# Patient Record
Sex: MALE | Race: WHITE | Employment: FULL TIME | URBAN - NONMETROPOLITAN AREA
[De-identification: names, ages, dates, MRNs, and addresses within clinical notes are randomized per-mention and may not be internally consistent; named-entity substitution may affect disease eponyms.]

---

## 2017-01-05 ENCOUNTER — APPOINTMENT (OUTPATIENT)
Dept: LAB | Age: 47
End: 2017-01-05
Attending: FAMILY MEDICINE
Payer: COMMERCIAL

## 2017-01-05 ENCOUNTER — OFFICE VISIT (OUTPATIENT)
Dept: FAMILY MEDICINE CLINIC | Facility: CLINIC | Age: 47
End: 2017-01-05

## 2017-01-05 VITALS
TEMPERATURE: 98 F | HEART RATE: 76 BPM | BODY MASS INDEX: 34 KG/M2 | OXYGEN SATURATION: 98 % | WEIGHT: 228 LBS | DIASTOLIC BLOOD PRESSURE: 80 MMHG | SYSTOLIC BLOOD PRESSURE: 122 MMHG

## 2017-01-05 DIAGNOSIS — N52.1 ERECTILE DYSFUNCTION DUE TO DISEASES CLASSIFIED ELSEWHERE: ICD-10-CM

## 2017-01-05 DIAGNOSIS — G54.0 THORACIC OUTLET SYNDROME: Primary | ICD-10-CM

## 2017-01-05 DIAGNOSIS — Z13.220 SCREENING FOR HYPERCHOLESTEROLEMIA: ICD-10-CM

## 2017-01-05 DIAGNOSIS — I10 ESSENTIAL HYPERTENSION: ICD-10-CM

## 2017-01-05 DIAGNOSIS — Z00.00 LABORATORY EXAM ORDERED AS PART OF ROUTINE GENERAL MEDICAL EXAMINATION: ICD-10-CM

## 2017-01-05 LAB
ALBUMIN SERPL-MCNC: 4.2 G/DL (ref 3.5–4.8)
ALP LIVER SERPL-CCNC: 88 U/L (ref 45–117)
ALT SERPL-CCNC: 96 U/L (ref 17–63)
AST SERPL-CCNC: 41 U/L (ref 15–41)
BILIRUB SERPL-MCNC: 0.4 MG/DL (ref 0.1–2)
BUN BLD-MCNC: 12 MG/DL (ref 8–20)
CALCIUM BLD-MCNC: 8.9 MG/DL (ref 8.3–10.3)
CHLORIDE: 104 MMOL/L (ref 101–111)
CHOLEST SMN-MCNC: 173 MG/DL (ref ?–200)
CO2: 27 MMOL/L (ref 22–32)
CREAT BLD-MCNC: 0.86 MG/DL (ref 0.7–1.3)
GLUCOSE BLD-MCNC: 122 MG/DL (ref 70–99)
HDLC SERPL-MCNC: 32 MG/DL (ref 45–?)
HDLC SERPL: 5.41 {RATIO} (ref ?–4.97)
LDLC SERPL CALC-MCNC: 110 MG/DL (ref ?–130)
M PROTEIN MFR SERPL ELPH: 7.7 G/DL (ref 6.1–8.3)
NONHDLC SERPL-MCNC: 141 MG/DL (ref ?–130)
POTASSIUM SERPL-SCNC: 4.3 MMOL/L (ref 3.6–5.1)
SODIUM SERPL-SCNC: 137 MMOL/L (ref 136–144)
TRIGLYCERIDES: 156 MG/DL (ref ?–150)
VLDL: 31 MG/DL (ref 5–40)

## 2017-01-05 PROCEDURE — 80053 COMPREHEN METABOLIC PANEL: CPT | Performed by: FAMILY MEDICINE

## 2017-01-05 PROCEDURE — 36415 COLL VENOUS BLD VENIPUNCTURE: CPT | Performed by: FAMILY MEDICINE

## 2017-01-05 PROCEDURE — 99214 OFFICE O/P EST MOD 30 MIN: CPT | Performed by: FAMILY MEDICINE

## 2017-01-05 PROCEDURE — 80061 LIPID PANEL: CPT | Performed by: FAMILY MEDICINE

## 2017-01-05 RX ORDER — LISINOPRIL 20 MG/1
TABLET ORAL
Qty: 90 TABLET | Refills: 1 | Status: SHIPPED | OUTPATIENT
Start: 2017-01-05 | End: 2017-09-25

## 2017-01-05 RX ORDER — SILDENAFIL 50 MG/1
50 TABLET, FILM COATED ORAL
Qty: 6 TABLET | Refills: 0 | Status: SHIPPED
Start: 2017-01-05

## 2017-01-05 NOTE — PROGRESS NOTES
HPI:    Patient ID: Rachel Barnett is a 51year old male. Patient presents with:  Numbness: TINGLING IN LEFT ARM---- THINKS IT COULD BE FROM LISINOPRIL-    HPI \"sometimes\" has pains and tingling into left finger and arm, no problems now, comes and goes x

## 2017-01-06 DIAGNOSIS — R74.8 ELEVATED LIVER ENZYMES: ICD-10-CM

## 2017-01-06 DIAGNOSIS — R73.09 ELEVATED GLUCOSE LEVEL: Primary | ICD-10-CM

## 2017-01-06 DIAGNOSIS — E78.00 ELEVATED CHOLESTEROL: ICD-10-CM

## 2017-01-10 ENCOUNTER — APPOINTMENT (OUTPATIENT)
Dept: LAB | Age: 47
End: 2017-01-10
Attending: FAMILY MEDICINE
Payer: COMMERCIAL

## 2017-01-10 DIAGNOSIS — R74.8 ELEVATED LIVER ENZYMES: ICD-10-CM

## 2017-01-10 DIAGNOSIS — R73.09 ELEVATED GLUCOSE LEVEL: ICD-10-CM

## 2017-01-10 LAB
EST. AVERAGE GLUCOSE BLD GHB EST-MCNC: 123 MG/DL (ref 68–126)
GLUCOSE BLD-MCNC: 102 MG/DL (ref 70–99)
HBA1C MFR BLD HPLC: 5.9 % (ref ?–5.7)
HBV SURFACE AB SER QL: NONREACTIVE
HBV SURFACE AB SERPL IA-ACNC: 7.46 MIU/ML
HBV SURFACE AG SERPL QL IA: NONREACTIVE
HEPATITIS C VIRUS AB INTERPRETATION: NONREACTIVE

## 2017-01-10 PROCEDURE — 82947 ASSAY GLUCOSE BLOOD QUANT: CPT

## 2017-01-10 PROCEDURE — 87340 HEPATITIS B SURFACE AG IA: CPT

## 2017-01-10 PROCEDURE — 36415 COLL VENOUS BLD VENIPUNCTURE: CPT

## 2017-01-10 PROCEDURE — 83036 HEMOGLOBIN GLYCOSYLATED A1C: CPT

## 2017-01-10 PROCEDURE — 86706 HEP B SURFACE ANTIBODY: CPT

## 2017-01-10 PROCEDURE — 86803 HEPATITIS C AB TEST: CPT

## 2017-01-11 DIAGNOSIS — R79.89 ELEVATED LFTS: ICD-10-CM

## 2017-01-11 DIAGNOSIS — R73.9 ELEVATED BLOOD SUGAR: Primary | ICD-10-CM

## 2017-02-02 ENCOUNTER — TELEPHONE (OUTPATIENT)
Dept: FAMILY MEDICINE CLINIC | Facility: CLINIC | Age: 47
End: 2017-02-02

## 2017-02-13 ENCOUNTER — MED REC SCAN ONLY (OUTPATIENT)
Dept: FAMILY MEDICINE CLINIC | Facility: CLINIC | Age: 47
End: 2017-02-13

## 2017-08-21 ENCOUNTER — TELEPHONE (OUTPATIENT)
Dept: FAMILY MEDICINE CLINIC | Facility: CLINIC | Age: 47
End: 2017-08-21

## 2017-09-25 RX ORDER — LISINOPRIL 20 MG/1
TABLET ORAL
Qty: 30 TABLET | Refills: 0 | Status: SHIPPED | OUTPATIENT
Start: 2017-09-25 | End: 2017-10-26

## 2017-10-26 NOTE — TELEPHONE ENCOUNTER
Last Ov 1/5/17, last labs 1/10/17, last refill 9/25/17. Message left for patient in August that he is due for labs.

## 2017-10-27 RX ORDER — LISINOPRIL 20 MG/1
TABLET ORAL
Qty: 30 TABLET | Refills: 0 | Status: SHIPPED | OUTPATIENT
Start: 2017-10-27 | End: 2017-11-07

## 2017-11-06 ENCOUNTER — APPOINTMENT (OUTPATIENT)
Dept: LAB | Age: 47
End: 2017-11-06
Attending: FAMILY MEDICINE
Payer: COMMERCIAL

## 2017-11-06 DIAGNOSIS — R73.9 ELEVATED BLOOD SUGAR: ICD-10-CM

## 2017-11-06 DIAGNOSIS — R79.89 ELEVATED LFTS: ICD-10-CM

## 2017-11-06 PROCEDURE — 83036 HEMOGLOBIN GLYCOSYLATED A1C: CPT | Performed by: FAMILY MEDICINE

## 2017-11-06 PROCEDURE — 80076 HEPATIC FUNCTION PANEL: CPT | Performed by: FAMILY MEDICINE

## 2017-11-06 PROCEDURE — 36415 COLL VENOUS BLD VENIPUNCTURE: CPT | Performed by: FAMILY MEDICINE

## 2017-11-07 ENCOUNTER — OFFICE VISIT (OUTPATIENT)
Dept: FAMILY MEDICINE CLINIC | Facility: CLINIC | Age: 47
End: 2017-11-07

## 2017-11-07 VITALS
HEART RATE: 72 BPM | BODY MASS INDEX: 32.07 KG/M2 | SYSTOLIC BLOOD PRESSURE: 122 MMHG | WEIGHT: 224 LBS | TEMPERATURE: 98 F | HEIGHT: 70 IN | DIASTOLIC BLOOD PRESSURE: 80 MMHG | OXYGEN SATURATION: 99 %

## 2017-11-07 DIAGNOSIS — R20.2 LEFT LEG PARESTHESIAS: ICD-10-CM

## 2017-11-07 DIAGNOSIS — R73.03 PRE-DIABETES: ICD-10-CM

## 2017-11-07 DIAGNOSIS — R11.0 POSTPRANDIAL NAUSEA: Primary | ICD-10-CM

## 2017-11-07 DIAGNOSIS — I10 ESSENTIAL HYPERTENSION: ICD-10-CM

## 2017-11-07 PROCEDURE — 99214 OFFICE O/P EST MOD 30 MIN: CPT | Performed by: FAMILY MEDICINE

## 2017-11-07 RX ORDER — LISINOPRIL 20 MG/1
20 TABLET ORAL
Qty: 30 TABLET | Refills: 6 | Status: SHIPPED | OUTPATIENT
Start: 2017-11-07 | End: 2018-06-25

## 2017-11-07 NOTE — PROGRESS NOTES
HPI:    Patient ID: Nury Arita is a 49year old male.   Patient presents with:  Lightheadedness: DIZZY ABOUT 30 MIN AFTER HE EATS--    HPI pt states he gets nauseated and \"dizzy\" 30 mins after eating, regardless of food, better 20 mins later, no verti normal.   Abdominal: Soft. Bowel sounds are normal. There is no tenderness.    Musculoskeletal:        Right hip: Normal.        Left hip: Normal.        Lumbar back: Normal.   Bilateral posterior hip tightness  Negative straight leg raising   Neurological: feel dietary changes will make a big difference in how he feels. I discussed the \"whole 30 diet\". I would like him to avoid grains, dairy and alcohol for 30 days. I would like him to avoid legumes but may have tree nuts.   He may use Dilia milk but no

## 2017-11-07 NOTE — PATIENT INSTRUCTIONS
I reviewed exam with the patient. I reviewed most recent labs including the presence of prediabetes. LFTs are still elevated but improving  I advised patient that I feel dietary changes will make a big difference in how he feels.   I discussed the [facility]

## 2017-11-28 ENCOUNTER — HOSPITAL ENCOUNTER (OUTPATIENT)
Age: 47
Discharge: HOME | End: 2017-11-28
Attending: FAMILY MEDICINE
Payer: OTHER MISCELLANEOUS

## 2017-11-28 ENCOUNTER — APPOINTMENT (OUTPATIENT)
Dept: CT IMAGING | Age: 47
End: 2017-11-28
Attending: FAMILY MEDICINE
Payer: OTHER MISCELLANEOUS

## 2017-11-28 VITALS
HEART RATE: 88 BPM | TEMPERATURE: 98 F | DIASTOLIC BLOOD PRESSURE: 88 MMHG | SYSTOLIC BLOOD PRESSURE: 140 MMHG | OXYGEN SATURATION: 98 % | RESPIRATION RATE: 16 BRPM

## 2017-11-28 DIAGNOSIS — R10.32 ABDOMINAL PAIN, LEFT LOWER QUADRANT: Primary | ICD-10-CM

## 2017-11-28 PROCEDURE — 74176 CT ABD & PELVIS W/O CONTRAST: CPT | Performed by: FAMILY MEDICINE

## 2017-11-28 PROCEDURE — 99215 OFFICE O/P EST HI 40 MIN: CPT

## 2017-11-28 PROCEDURE — 99214 OFFICE O/P EST MOD 30 MIN: CPT

## 2017-11-28 NOTE — ED INITIAL ASSESSMENT (HPI)
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 Pt states pain worsens with any movement. No vomiting or bulging in the area.

## 2017-11-30 NOTE — ED PROVIDER NOTES
Patient Seen in: [address] [facility]    History   Patient presents with:  Abdomen/Flank Pain (GI/)    Stated Complaint: Ab pain left side    HPI    49year old male presents for left sided abdominal pain.  Patients states last night , he was atraumatic. Right Ear: External ear normal.   Left Ear: External ear normal.   Nose: Nose normal.   Mouth/Throat: Oropharynx is clear and moist.   Eyes: Conjunctivae and EOM are normal. Pupils are equal, round, and reactive to light.    Neck: Normal range

## 2017-12-01 ENCOUNTER — OFFICE VISIT (OUTPATIENT)
Dept: FAMILY MEDICINE CLINIC | Facility: CLINIC | Age: 47
End: 2017-12-01

## 2017-12-01 VITALS
DIASTOLIC BLOOD PRESSURE: 80 MMHG | SYSTOLIC BLOOD PRESSURE: 120 MMHG | BODY MASS INDEX: 32.35 KG/M2 | OXYGEN SATURATION: 99 % | HEIGHT: 70 IN | WEIGHT: 226 LBS | TEMPERATURE: 98 F | HEART RATE: 72 BPM

## 2017-12-01 DIAGNOSIS — R91.1 INCIDENTAL LUNG NODULE: ICD-10-CM

## 2017-12-01 DIAGNOSIS — S39.011D STRAIN OF ABDOMINAL MUSCLE, SUBSEQUENT ENCOUNTER: Primary | ICD-10-CM

## 2017-12-01 PROCEDURE — 99214 OFFICE O/P EST MOD 30 MIN: CPT | Performed by: FAMILY MEDICINE

## 2017-12-01 NOTE — PROGRESS NOTES
HPI:    Patient ID: Chel Simental is a 49year old male.   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 Neurological: He is alert and oriented to person, place, and time. Skin: Skin is warm and dry. Psychiatric: His mood appears anxious.    Blood pressure 120/80, pulse 72, temperature 97.7 °F (36.5 °C), tempera

## 2017-12-01 NOTE — PATIENT INSTRUCTIONS
I discussed the diagnosis of abdominal wall strain as well as avoidance of contributing factors. Patient may use moist heat and NSAIDs such as Aleve or ibuprofen as needed for pain or discomfort.   Note was given for him to return to work 12/4/17 without r

## 2018-06-25 RX ORDER — LISINOPRIL 20 MG/1
TABLET ORAL
Qty: 30 TABLET | Refills: 0 | Status: SHIPPED | OUTPATIENT
Start: 2018-06-25 | End: 2018-09-25

## 2018-07-23 RX ORDER — LISINOPRIL 20 MG/1
TABLET ORAL
Qty: 30 TABLET | Refills: 0 | OUTPATIENT
Start: 2018-07-23

## 2018-07-23 NOTE — TELEPHONE ENCOUNTER
CONTACTED PATIENT TO SCHEDULE OV AND LABS, STATES HE HAS TO LOOK AT HIS SCHEDULE. ALSO STATES THAT HE HAS \"PLENTY\" OF MEDICATION; SO REFILL REQUEST DENIED, ADVISED PATIENT TO SCHEDULE APPT PRIOR TO RUNNING OUT.      PATIENT EXPRESSED UNDERSTANDING AND S

## 2018-09-25 ENCOUNTER — TELEPHONE (OUTPATIENT)
Dept: FAMILY MEDICINE CLINIC | Facility: CLINIC | Age: 48
End: 2018-09-25

## 2018-09-25 RX ORDER — LISINOPRIL 20 MG/1
TABLET ORAL
Qty: 30 TABLET | Refills: 0 | Status: SHIPPED | OUTPATIENT
Start: 2018-09-25 | End: 2018-10-02

## 2018-09-25 NOTE — TELEPHONE ENCOUNTER
LISINOPRIL 20 MG Oral Tab   PT HAS 4 PILLS LEFT    PT IS COMING IN FOR A BP CHECK W/DR NIELSEN ON 10/2 @8:30

## 2018-10-02 ENCOUNTER — OFFICE VISIT (OUTPATIENT)
Dept: FAMILY MEDICINE CLINIC | Facility: CLINIC | Age: 48
End: 2018-10-02
Payer: COMMERCIAL

## 2018-10-02 VITALS
OXYGEN SATURATION: 98 % | DIASTOLIC BLOOD PRESSURE: 86 MMHG | HEIGHT: 69 IN | HEART RATE: 76 BPM | SYSTOLIC BLOOD PRESSURE: 128 MMHG | BODY MASS INDEX: 34.66 KG/M2 | WEIGHT: 234 LBS | TEMPERATURE: 98 F

## 2018-10-02 DIAGNOSIS — Z23 NEED FOR VACCINATION: ICD-10-CM

## 2018-10-02 DIAGNOSIS — E66.9 OBESITY (BMI 30.0-34.9): ICD-10-CM

## 2018-10-02 DIAGNOSIS — Z13.220 SCREENING FOR HYPERCHOLESTEROLEMIA: ICD-10-CM

## 2018-10-02 DIAGNOSIS — K76.0 FATTY LIVER: ICD-10-CM

## 2018-10-02 DIAGNOSIS — R73.03 PRE-DIABETES: ICD-10-CM

## 2018-10-02 DIAGNOSIS — I10 ESSENTIAL HYPERTENSION: Primary | ICD-10-CM

## 2018-10-02 PROCEDURE — 99214 OFFICE O/P EST MOD 30 MIN: CPT | Performed by: FAMILY MEDICINE

## 2018-10-02 PROCEDURE — 90686 IIV4 VACC NO PRSV 0.5 ML IM: CPT | Performed by: FAMILY MEDICINE

## 2018-10-02 PROCEDURE — 90471 IMMUNIZATION ADMIN: CPT | Performed by: FAMILY MEDICINE

## 2018-10-02 RX ORDER — LISINOPRIL 20 MG/1
TABLET ORAL
Qty: 30 TABLET | Refills: 6 | Status: SHIPPED | OUTPATIENT
Start: 2018-10-02 | End: 2019-05-07

## 2018-10-02 NOTE — PROGRESS NOTES
HPI:    Patient ID: Virgil Amador is a 51year old male.     Patient presents with:  HTN: f/u  Pre-diabetes: f/u    occ checks bp, 130-134/80-85  Not exercising, 3 cokes , high carb        Review of Systems   Constitutional: Negative for activity change, a body weight. I discussed importance of annual dilated retinal exams and glaucoma screening, routine foot exams and good fitting footwear.   Chris alvarez fasting lipids, cmp, hgba1c  Discussed importance of lower carbohydrate food choices, eating behavior may

## 2019-05-07 RX ORDER — LISINOPRIL 20 MG/1
TABLET ORAL
Qty: 30 TABLET | Refills: 0 | Status: SHIPPED | OUTPATIENT
Start: 2019-05-07 | End: 2019-07-24

## 2019-07-24 RX ORDER — LISINOPRIL 20 MG/1
TABLET ORAL
Qty: 90 TABLET | Refills: 0 | Status: SHIPPED | OUTPATIENT
Start: 2019-07-24 | End: 2019-10-17

## 2019-07-31 ENCOUNTER — PATIENT OUTREACH (OUTPATIENT)
Dept: FAMILY MEDICINE CLINIC | Facility: CLINIC | Age: 49
End: 2019-07-31

## 2019-10-07 ENCOUNTER — PATIENT OUTREACH (OUTPATIENT)
Dept: FAMILY MEDICINE CLINIC | Facility: CLINIC | Age: 49
End: 2019-10-07

## 2019-10-17 RX ORDER — LISINOPRIL 20 MG/1
TABLET ORAL
Qty: 30 TABLET | Refills: 0 | Status: SHIPPED | OUTPATIENT
Start: 2019-10-17 | End: 2020-02-26

## 2019-10-17 RX ORDER — LISINOPRIL 20 MG/1
TABLET ORAL
Qty: 30 TABLET | Refills: 0 | Status: SHIPPED | OUTPATIENT
Start: 2019-10-17 | End: 2019-10-17

## 2019-10-17 NOTE — TELEPHONE ENCOUNTER
Last office visit: 10/2/18 /86  Last refill: 7/24/19  Labs Due: 10/2/18  No future appointments. Message left for patient to call back and schedule labs and office visit.

## 2020-02-26 ENCOUNTER — OFFICE VISIT (OUTPATIENT)
Dept: FAMILY MEDICINE CLINIC | Facility: CLINIC | Age: 50
End: 2020-02-26
Payer: COMMERCIAL

## 2020-02-26 ENCOUNTER — APPOINTMENT (OUTPATIENT)
Dept: LAB | Age: 50
End: 2020-02-26
Attending: FAMILY MEDICINE
Payer: COMMERCIAL

## 2020-02-26 VITALS
TEMPERATURE: 97 F | SYSTOLIC BLOOD PRESSURE: 116 MMHG | HEIGHT: 69 IN | BODY MASS INDEX: 33.03 KG/M2 | HEART RATE: 82 BPM | WEIGHT: 223 LBS | DIASTOLIC BLOOD PRESSURE: 80 MMHG | OXYGEN SATURATION: 98 %

## 2020-02-26 DIAGNOSIS — R73.03 PRE-DIABETES: ICD-10-CM

## 2020-02-26 DIAGNOSIS — Z51.81 ENCOUNTER FOR MEDICATION MONITORING: ICD-10-CM

## 2020-02-26 DIAGNOSIS — H57.89 IRRITATION OF LEFT EYE: ICD-10-CM

## 2020-02-26 DIAGNOSIS — I10 ESSENTIAL HYPERTENSION: ICD-10-CM

## 2020-02-26 DIAGNOSIS — Z12.5 SCREENING FOR PROSTATE CANCER: ICD-10-CM

## 2020-02-26 DIAGNOSIS — I10 ESSENTIAL HYPERTENSION: Primary | ICD-10-CM

## 2020-02-26 DIAGNOSIS — Z12.11 SCREEN FOR COLON CANCER: ICD-10-CM

## 2020-02-26 DIAGNOSIS — E66.9 OBESITY (BMI 30.0-34.9): ICD-10-CM

## 2020-02-26 DIAGNOSIS — K76.0 FATTY LIVER: ICD-10-CM

## 2020-02-26 DIAGNOSIS — Z13.220 SCREENING, LIPID: ICD-10-CM

## 2020-02-26 DIAGNOSIS — Z80.8 FH: MELANOMA: ICD-10-CM

## 2020-02-26 LAB
ALBUMIN SERPL-MCNC: 4 G/DL (ref 3.4–5)
ALBUMIN/GLOB SERPL: 0.9 {RATIO} (ref 1–2)
ALP LIVER SERPL-CCNC: 80 U/L (ref 45–117)
ALT SERPL-CCNC: 62 U/L (ref 16–61)
ANION GAP SERPL CALC-SCNC: 6 MMOL/L (ref 0–18)
AST SERPL-CCNC: 31 U/L (ref 15–37)
BILIRUB SERPL-MCNC: 0.6 MG/DL (ref 0.1–2)
BUN BLD-MCNC: 15 MG/DL (ref 7–18)
BUN/CREAT SERPL: 16.7 (ref 10–20)
CALCIUM BLD-MCNC: 9.8 MG/DL (ref 8.5–10.1)
CHLORIDE SERPL-SCNC: 105 MMOL/L (ref 98–112)
CHOLEST SMN-MCNC: 192 MG/DL (ref ?–200)
CO2 SERPL-SCNC: 26 MMOL/L (ref 21–32)
COMPLEXED PSA SERPL-MCNC: 0.7 NG/ML (ref ?–4)
CREAT BLD-MCNC: 0.9 MG/DL (ref 0.7–1.3)
EST. AVERAGE GLUCOSE BLD GHB EST-MCNC: 131 MG/DL (ref 68–126)
GLOBULIN PLAS-MCNC: 4.3 G/DL (ref 2.8–4.4)
GLUCOSE BLD-MCNC: 131 MG/DL (ref 70–99)
HBA1C MFR BLD HPLC: 6.2 % (ref ?–5.7)
HDLC SERPL-MCNC: 35 MG/DL (ref 40–59)
LDLC SERPL CALC-MCNC: 128 MG/DL (ref ?–100)
M PROTEIN MFR SERPL ELPH: 8.3 G/DL (ref 6.4–8.2)
NONHDLC SERPL-MCNC: 157 MG/DL (ref ?–130)
OSMOLALITY SERPL CALC.SUM OF ELEC: 287 MOSM/KG (ref 275–295)
PATIENT FASTING Y/N/NP: YES
PATIENT FASTING Y/N/NP: YES
POTASSIUM SERPL-SCNC: 4.2 MMOL/L (ref 3.5–5.1)
SODIUM SERPL-SCNC: 137 MMOL/L (ref 136–145)
TRIGL SERPL-MCNC: 144 MG/DL (ref 30–149)
VLDLC SERPL CALC-MCNC: 29 MG/DL (ref 0–30)

## 2020-02-26 PROCEDURE — 80053 COMPREHEN METABOLIC PANEL: CPT | Performed by: FAMILY MEDICINE

## 2020-02-26 PROCEDURE — 84153 ASSAY OF PSA TOTAL: CPT | Performed by: FAMILY MEDICINE

## 2020-02-26 PROCEDURE — 83036 HEMOGLOBIN GLYCOSYLATED A1C: CPT | Performed by: FAMILY MEDICINE

## 2020-02-26 PROCEDURE — 99214 OFFICE O/P EST MOD 30 MIN: CPT | Performed by: FAMILY MEDICINE

## 2020-02-26 PROCEDURE — 36415 COLL VENOUS BLD VENIPUNCTURE: CPT | Performed by: FAMILY MEDICINE

## 2020-02-26 PROCEDURE — 80061 LIPID PANEL: CPT | Performed by: FAMILY MEDICINE

## 2020-02-26 RX ORDER — LISINOPRIL 20 MG/1
TABLET ORAL
Qty: 90 TABLET | Refills: 1 | Status: SHIPPED | OUTPATIENT
Start: 2020-02-26 | End: 2020-10-12

## 2020-02-26 RX ORDER — TOBRAMYCIN 3 MG/ML
1 SOLUTION/ DROPS OPHTHALMIC 4 TIMES DAILY
Qty: 5 ML | Refills: 0 | Status: SHIPPED | OUTPATIENT
Start: 2020-02-26 | End: 2020-03-02

## 2020-02-26 NOTE — PATIENT INSTRUCTIONS
I reviewed goals for blood pressure as well as conservative management of hypertension including sodium restriction, daily aerobic activity, alcohol moderation, smoking cessation, and maintaining ideal body weight.   I reviewed age-appropriate preventive he

## 2020-02-26 NOTE — PROGRESS NOTES
HPI:    Patient ID: Nader Barbour is a 47year old male.     Patient presents with:  HTN: f/u  Pre-diabetes: f/u  Obesity: f/u    Pt occasionally checking bp, 128-130/85  Wt down 11# since last visit  Energy \"ok\"  not exercising, starting keto diet, cutt round, and reactive to light. Conjunctivae, EOM and lids are normal. Lids are everted and swept, no foreign bodies found. Right eye exhibits no chemosis, no discharge, no exudate and no hordeolum. No foreign body present in the right eye.  Left eye exhibits aerobic activity with a goal of weight reduction. This will improve management of hypertension and help prevent development of diabetes and progressive liver disease.   Patient congratulated on making dietary changes  Would recommend the use of warm compre

## 2020-02-27 DIAGNOSIS — E78.00 ELEVATED CHOLESTEROL: ICD-10-CM

## 2020-02-27 DIAGNOSIS — K76.0 FATTY LIVER: ICD-10-CM

## 2020-02-27 DIAGNOSIS — R73.03 PRE-DIABETES: ICD-10-CM

## 2020-02-27 DIAGNOSIS — I10 ESSENTIAL HYPERTENSION: Primary | ICD-10-CM

## 2020-10-12 RX ORDER — LISINOPRIL 20 MG/1
TABLET ORAL
Qty: 30 TABLET | Refills: 0 | Status: SHIPPED | OUTPATIENT
Start: 2020-10-12 | End: 2020-11-09

## 2020-10-12 NOTE — TELEPHONE ENCOUNTER
Last refill #90 x 1 on 2/26/2020  Last office visit on 2/26/2020  No future appointments. BP Readings from Last 3 Encounters:  02/26/20 : 116/80  10/02/18 : 128/86  12/01/17 : 120/80    Patient is due for labs.

## 2020-11-09 ENCOUNTER — TELEPHONE (OUTPATIENT)
Dept: FAMILY MEDICINE CLINIC | Facility: CLINIC | Age: 50
End: 2020-11-09

## 2020-11-09 RX ORDER — LISINOPRIL 20 MG/1
TABLET ORAL
Qty: 15 TABLET | Refills: 0 | Status: SHIPPED | OUTPATIENT
Start: 2020-11-09 | End: 2021-05-05 | Stop reason: DRUGHIGH

## 2020-11-09 NOTE — TELEPHONE ENCOUNTER
Patient called back and was advised that he is due for an OV and fasting labs. Patient states that he does not want to come into the office because of COVID. Advised that we are not seeing sick patients in the office and he still declined an OV.  I did offe

## 2020-11-09 NOTE — TELEPHONE ENCOUNTER
Last OV: 2/26/20  Last labs: 2/26/20    No future appointments. Due for OV and fasting labs---- Left message for pt to call back to make an appointment. Qty reduced again.

## 2021-05-05 ENCOUNTER — OFFICE VISIT (OUTPATIENT)
Dept: FAMILY MEDICINE CLINIC | Facility: CLINIC | Age: 51
End: 2021-05-05
Payer: COMMERCIAL

## 2021-05-05 ENCOUNTER — MED REC SCAN ONLY (OUTPATIENT)
Dept: FAMILY MEDICINE CLINIC | Facility: CLINIC | Age: 51
End: 2021-05-05

## 2021-05-05 VITALS
BODY MASS INDEX: 33.77 KG/M2 | SYSTOLIC BLOOD PRESSURE: 148 MMHG | OXYGEN SATURATION: 98 % | WEIGHT: 228 LBS | HEART RATE: 76 BPM | RESPIRATION RATE: 16 BRPM | DIASTOLIC BLOOD PRESSURE: 98 MMHG | HEIGHT: 69 IN | TEMPERATURE: 98 F

## 2021-05-05 DIAGNOSIS — K76.0 FATTY LIVER: ICD-10-CM

## 2021-05-05 DIAGNOSIS — Z12.11 SCREEN FOR COLON CANCER: ICD-10-CM

## 2021-05-05 DIAGNOSIS — E78.6 LOW HDL (UNDER 40): ICD-10-CM

## 2021-05-05 DIAGNOSIS — I10 ESSENTIAL HYPERTENSION: Primary | ICD-10-CM

## 2021-05-05 DIAGNOSIS — R73.03 PRE-DIABETES: ICD-10-CM

## 2021-05-05 PROCEDURE — 3077F SYST BP >= 140 MM HG: CPT | Performed by: FAMILY MEDICINE

## 2021-05-05 PROCEDURE — 80061 LIPID PANEL: CPT | Performed by: FAMILY MEDICINE

## 2021-05-05 PROCEDURE — 80053 COMPREHEN METABOLIC PANEL: CPT | Performed by: FAMILY MEDICINE

## 2021-05-05 PROCEDURE — 3008F BODY MASS INDEX DOCD: CPT | Performed by: FAMILY MEDICINE

## 2021-05-05 PROCEDURE — 83036 HEMOGLOBIN GLYCOSYLATED A1C: CPT | Performed by: FAMILY MEDICINE

## 2021-05-05 PROCEDURE — 99213 OFFICE O/P EST LOW 20 MIN: CPT | Performed by: FAMILY MEDICINE

## 2021-05-05 PROCEDURE — 3080F DIAST BP >= 90 MM HG: CPT | Performed by: FAMILY MEDICINE

## 2021-05-05 RX ORDER — LISINOPRIL 10 MG/1
10 TABLET ORAL DAILY
Qty: 90 TABLET | Refills: 1 | Status: SHIPPED | OUTPATIENT
Start: 2021-05-05 | End: 2021-10-21

## 2021-05-05 RX ORDER — LISINOPRIL 20 MG/1
TABLET ORAL
Qty: 15 TABLET | Refills: 0 | Status: CANCELLED | OUTPATIENT
Start: 2021-05-05

## 2021-05-05 NOTE — PROGRESS NOTES
Nury Arita is a 45year old male. Patient presents with:  HTN  Medication Follow-Up  Pre-diabetes  Obesity    HPI:   Wt up 5# since last visit  Pt has checking bp, going up since being out of meds, out of meds x 1 month  Energy good  Activity: walking d peripheral pulses intact  GI: good BS's,no masses, HSM or tenderness  EXTREMITIES: FROM of all joints    ASSESSMENT AND PLAN:     Essential hypertension  (primary encounter diagnosis)  Pre-diabetes  Fatty liver  Screen for colon cancer  Low hdl (under 40)

## 2021-05-05 NOTE — PATIENT INSTRUCTIONS
I reviewed goals for blood pressure as well as conservative management of hypertension including sodium restriction, daily aerobic activity, alcohol moderation, smoking cessation, and maintaining ideal body weight.   Discussed importance of lower carbohydra

## 2021-10-20 DIAGNOSIS — I10 ESSENTIAL HYPERTENSION: ICD-10-CM

## 2021-10-21 RX ORDER — LISINOPRIL 10 MG/1
10 TABLET ORAL DAILY
Qty: 30 TABLET | Refills: 0 | Status: SHIPPED | OUTPATIENT
Start: 2021-10-21 | End: 2021-12-06

## 2021-10-21 NOTE — TELEPHONE ENCOUNTER
Requested Prescriptions     Pending Prescriptions Disp Refills   • lisinopril 10 MG Oral Tab 90 tablet 1     Sig: Take 1 tablet (10 mg total) by mouth daily.      Last refill #90 x 1 on 5/5/2021  Last office visit pertaining to refill on 5/5/2021  No future

## 2021-12-06 ENCOUNTER — TELEPHONE (OUTPATIENT)
Dept: FAMILY MEDICINE CLINIC | Facility: CLINIC | Age: 51
End: 2021-12-06

## 2021-12-06 DIAGNOSIS — Z79.899 ENCOUNTER FOR LONG-TERM (CURRENT) DRUG USE: ICD-10-CM

## 2021-12-06 DIAGNOSIS — E78.00 ELEVATED CHOLESTEROL: Primary | ICD-10-CM

## 2021-12-06 DIAGNOSIS — I10 ESSENTIAL HYPERTENSION: ICD-10-CM

## 2021-12-06 RX ORDER — LISINOPRIL 10 MG/1
10 TABLET ORAL DAILY
Qty: 30 TABLET | Refills: 0 | Status: SHIPPED | OUTPATIENT
Start: 2021-12-06

## 2021-12-06 NOTE — TELEPHONE ENCOUNTER
Last OV 5/5/21  No future appts scheduled    Due for OV and labs---CMP and lipids.     Pt advised---he will c/b to schedule an appt after 1/1/22

## 2022-04-05 ENCOUNTER — LABORATORY ENCOUNTER (OUTPATIENT)
Dept: LAB | Age: 52
End: 2022-04-05
Attending: FAMILY MEDICINE
Payer: COMMERCIAL

## 2022-04-05 ENCOUNTER — OFFICE VISIT (OUTPATIENT)
Dept: FAMILY MEDICINE CLINIC | Facility: CLINIC | Age: 52
End: 2022-04-05
Payer: COMMERCIAL

## 2022-04-05 VITALS
HEART RATE: 86 BPM | WEIGHT: 222 LBS | HEIGHT: 69 IN | DIASTOLIC BLOOD PRESSURE: 94 MMHG | BODY MASS INDEX: 32.88 KG/M2 | SYSTOLIC BLOOD PRESSURE: 150 MMHG | RESPIRATION RATE: 16 BRPM | OXYGEN SATURATION: 97 % | TEMPERATURE: 98 F

## 2022-04-05 DIAGNOSIS — M72.2 PLANTAR FASCIITIS, RIGHT: ICD-10-CM

## 2022-04-05 DIAGNOSIS — Z12.5 SCREENING FOR PROSTATE CANCER: ICD-10-CM

## 2022-04-05 DIAGNOSIS — R73.09 ELEVATED HEMOGLOBIN A1C: ICD-10-CM

## 2022-04-05 DIAGNOSIS — E78.00 ELEVATED CHOLESTEROL: ICD-10-CM

## 2022-04-05 DIAGNOSIS — E66.9 OBESITY (BMI 30.0-34.9): ICD-10-CM

## 2022-04-05 DIAGNOSIS — E11.9 TYPE 2 DIABETES MELLITUS WITHOUT COMPLICATION, WITHOUT LONG-TERM CURRENT USE OF INSULIN (HCC): ICD-10-CM

## 2022-04-05 DIAGNOSIS — Z12.11 SCREEN FOR COLON CANCER: ICD-10-CM

## 2022-04-05 DIAGNOSIS — I10 ESSENTIAL HYPERTENSION: Primary | ICD-10-CM

## 2022-04-05 DIAGNOSIS — Z79.899 ENCOUNTER FOR LONG-TERM (CURRENT) DRUG USE: ICD-10-CM

## 2022-04-05 LAB
ALBUMIN SERPL-MCNC: 4.3 G/DL (ref 3.4–5)
ALBUMIN/GLOB SERPL: 1.2 {RATIO} (ref 1–2)
ALP LIVER SERPL-CCNC: 78 U/L
ALT SERPL-CCNC: 58 U/L
ANION GAP SERPL CALC-SCNC: 8 MMOL/L (ref 0–18)
AST SERPL-CCNC: 27 U/L (ref 15–37)
BILIRUB SERPL-MCNC: 0.6 MG/DL (ref 0.1–2)
BUN BLD-MCNC: 17 MG/DL (ref 7–18)
CALCIUM BLD-MCNC: 9.8 MG/DL (ref 8.5–10.1)
CHLORIDE SERPL-SCNC: 102 MMOL/L (ref 98–112)
CHOLEST SERPL-MCNC: 196 MG/DL (ref ?–200)
CO2 SERPL-SCNC: 28 MMOL/L (ref 21–32)
COMPLEXED PSA SERPL-MCNC: 0.87 NG/ML (ref ?–4)
CREAT BLD-MCNC: 0.83 MG/DL
EST. AVERAGE GLUCOSE BLD GHB EST-MCNC: 134 MG/DL (ref 68–126)
FASTING PATIENT LIPID ANSWER: YES
FASTING STATUS PATIENT QL REPORTED: YES
GLOBULIN PLAS-MCNC: 3.5 G/DL (ref 2.8–4.4)
GLUCOSE BLD-MCNC: 131 MG/DL (ref 70–99)
HBA1C MFR BLD: 6.3 % (ref ?–5.7)
HDLC SERPL-MCNC: 35 MG/DL (ref 40–59)
LDLC SERPL CALC-MCNC: 116 MG/DL (ref ?–100)
NONHDLC SERPL-MCNC: 161 MG/DL (ref ?–130)
OSMOLALITY SERPL CALC.SUM OF ELEC: 289 MOSM/KG (ref 275–295)
POTASSIUM SERPL-SCNC: 4.4 MMOL/L (ref 3.5–5.1)
PROT SERPL-MCNC: 7.8 G/DL (ref 6.4–8.2)
SODIUM SERPL-SCNC: 138 MMOL/L (ref 136–145)
TRIGL SERPL-MCNC: 256 MG/DL (ref 30–149)
VLDLC SERPL CALC-MCNC: 45 MG/DL (ref 0–30)

## 2022-04-05 PROCEDURE — 3080F DIAST BP >= 90 MM HG: CPT | Performed by: FAMILY MEDICINE

## 2022-04-05 PROCEDURE — 99214 OFFICE O/P EST MOD 30 MIN: CPT | Performed by: FAMILY MEDICINE

## 2022-04-05 PROCEDURE — 3077F SYST BP >= 140 MM HG: CPT | Performed by: FAMILY MEDICINE

## 2022-04-05 PROCEDURE — 3008F BODY MASS INDEX DOCD: CPT | Performed by: FAMILY MEDICINE

## 2022-04-05 RX ORDER — LISINOPRIL 20 MG/1
20 TABLET ORAL DAILY
Qty: 90 TABLET | Refills: 0 | Status: SHIPPED | OUTPATIENT
Start: 2022-04-05

## 2022-04-05 RX ORDER — LISINOPRIL 10 MG/1
10 TABLET ORAL DAILY
Qty: 90 TABLET | Refills: 0 | Status: CANCELLED | OUTPATIENT
Start: 2022-04-05

## 2022-04-05 NOTE — PATIENT INSTRUCTIONS
1. Essential hypertension  I reviewed goals for blood pressure as well as conservative management of hypertension including sodium restriction, daily aerobic activity, alcohol moderation, smoking cessation, and maintaining ideal body weight. We will increase lisinopril to 20 mg daily. I have asked patient to try and take his medication daily throughout the week as close to the same time of day as possible. Discussed importance of home blood pressure monitoring at least several times per week taking every 3 consecutive readings at various times of the day and reporting his numbers over the next several weeks. 2. Type 2 diabetes mellitus without complication, without long-term current use of insulin ([address]  I discussed goals for fasting and postmeal blood sugars as well as the endorgan complications of poorly controlled diabetes. Discussed importance of diet and exercise with a goal of weight loss    3. Plantar fasciitis, right  Discussed importance of hamstring, calf, Achilles tendon and plantar tendon stretches. Discussed good fitting footwear and appropriate arch supports    4. Obesity (BMI 30.0-34. 9)  Discussed importance of lower carbohydrate food choices, avoiding starches, eating behavior modification daily aerobic activity with a realistic goal of 1 to 2 pound weight loss per week    5. Screen for colon cancer  Discussed current recommendations for colon cancer screening. Patient referred to Dr. Mindy Ray for baseline colonoscopy  - [address] [facility] [place], [place]

## 2022-06-29 RX ORDER — LISINOPRIL 20 MG/1
20 TABLET ORAL DAILY
Qty: 90 TABLET | Refills: 0 | Status: SHIPPED | OUTPATIENT
Start: 2022-06-29

## 2022-09-24 DIAGNOSIS — I10 ESSENTIAL HYPERTENSION: Primary | ICD-10-CM

## 2022-09-24 RX ORDER — LISINOPRIL 20 MG/1
TABLET ORAL
Qty: 90 TABLET | Refills: 0 | Status: SHIPPED | OUTPATIENT
Start: 2022-09-24

## 2022-10-05 NOTE — PROGRESS NOTES
Due for physical and colon cancer screening.   [phone]  Left message for patient to call back to schedule physical.
Oriented - self; Oriented - place; Oriented - time

## 2022-10-25 ENCOUNTER — OFFICE VISIT (OUTPATIENT)
Dept: FAMILY MEDICINE CLINIC | Facility: CLINIC | Age: 52
End: 2022-10-25

## 2022-10-25 VITALS
BODY MASS INDEX: 33.77 KG/M2 | HEIGHT: 69 IN | OXYGEN SATURATION: 97 % | DIASTOLIC BLOOD PRESSURE: 88 MMHG | HEART RATE: 87 BPM | SYSTOLIC BLOOD PRESSURE: 138 MMHG | RESPIRATION RATE: 16 BRPM | WEIGHT: 228 LBS | TEMPERATURE: 97 F

## 2022-10-25 DIAGNOSIS — S39.011A STRAIN OF ABDOMINAL MUSCLE, INITIAL ENCOUNTER: Primary | ICD-10-CM

## 2022-10-25 PROCEDURE — 99213 OFFICE O/P EST LOW 20 MIN: CPT | Performed by: FAMILY MEDICINE

## 2022-10-25 NOTE — PATIENT INSTRUCTIONS
I discussed diagnosis. I advised patient that I do not see any evidence of recurrent hernia. Patient was counseled against breath-holding when lifting.   May return to work without restrictions

## 2022-11-15 ENCOUNTER — TELEPHONE (OUTPATIENT)
Dept: FAMILY MEDICINE CLINIC | Facility: CLINIC | Age: 52
End: 2022-11-15

## 2022-11-15 NOTE — TELEPHONE ENCOUNTER
Received [facility] (work comp) requesting medical records for 10/25/22.   Sent to scan stat - 11/15/22

## 2022-12-19 DIAGNOSIS — I10 ESSENTIAL HYPERTENSION: ICD-10-CM

## 2022-12-20 RX ORDER — LISINOPRIL 20 MG/1
TABLET ORAL
Qty: 90 TABLET | Refills: 0 | Status: SHIPPED | OUTPATIENT
Start: 2022-12-20

## 2022-12-20 NOTE — TELEPHONE ENCOUNTER
Last refilled 9/24/22 #90/0  Last ov 10/25/22  Last labs 4/5/22    Medication refilled per protocol.

## 2023-03-16 DIAGNOSIS — I10 ESSENTIAL HYPERTENSION: ICD-10-CM

## 2023-03-16 RX ORDER — LISINOPRIL 20 MG/1
20 TABLET ORAL DAILY
Qty: 90 TABLET | Refills: 0 | Status: SHIPPED | OUTPATIENT
Start: 2023-03-16

## 2023-06-19 DIAGNOSIS — I10 ESSENTIAL HYPERTENSION: ICD-10-CM

## 2023-06-19 RX ORDER — LISINOPRIL 20 MG/1
20 TABLET ORAL DAILY
Qty: 90 TABLET | Refills: 1 | Status: SHIPPED | OUTPATIENT
Start: 2023-06-19

## 2023-06-19 NOTE — TELEPHONE ENCOUNTER
LISINOPRIL 20 MG Oral Tab       LOV  10-25-22    LAST LAB  4-5-22 Chem Profile                           Creatinine  0.83    LAST RX   3-16-23  #90    Next OV  No future appointments.       PROTOCOL  Hypertension Medications Protocol Failed 06/19/2023 11:40 AM   Protocol Details  CMP or BMP in past 12 months    Appointment in past 6 or next 3 months    Last serum creatinine< 2.0       Left message for patient to call back on designated voice mail

## 2023-10-09 ENCOUNTER — TELEPHONE (OUTPATIENT)
Dept: FAMILY MEDICINE CLINIC | Facility: CLINIC | Age: 53
End: 2023-10-09

## 2023-10-09 NOTE — TELEPHONE ENCOUNTER
Spoke with pt re: below. Advised if he is having a lot of pain, to go to the IC today, or if worse tonight to the ER. Pt states it's just more \"annoying\". He will keep appt for tomorrow.

## 2023-10-09 NOTE — TELEPHONE ENCOUNTER
PT. CALLING WANTED APPT. TODAY FOR AB PAIN/NOT SEVERE PAIN. HE SAID ITS KIND OF LIKE A PULLING/HE SAID IT KIND OF FEELS LIKE WHEN HE HAD A HERNIA IN THE PAST. I SCHEDULED HIM TOMORROW MORNING BUT PT. WANTING A CALL BACK TODAY.

## 2023-10-10 ENCOUNTER — OFFICE VISIT (OUTPATIENT)
Dept: FAMILY MEDICINE CLINIC | Facility: CLINIC | Age: 53
End: 2023-10-10
Payer: COMMERCIAL

## 2023-10-10 ENCOUNTER — LABORATORY ENCOUNTER (OUTPATIENT)
Dept: LAB | Age: 53
End: 2023-10-10
Attending: FAMILY MEDICINE
Payer: COMMERCIAL

## 2023-10-10 VITALS
OXYGEN SATURATION: 97 % | DIASTOLIC BLOOD PRESSURE: 86 MMHG | RESPIRATION RATE: 16 BRPM | WEIGHT: 230 LBS | SYSTOLIC BLOOD PRESSURE: 130 MMHG | TEMPERATURE: 98 F | BODY MASS INDEX: 34 KG/M2 | HEART RATE: 108 BPM

## 2023-10-10 DIAGNOSIS — R10.32 LLQ ABDOMINAL PAIN: Primary | ICD-10-CM

## 2023-10-10 DIAGNOSIS — R73.03 PRE-DIABETES: ICD-10-CM

## 2023-10-10 DIAGNOSIS — Z12.5 SCREENING FOR PROSTATE CANCER: ICD-10-CM

## 2023-10-10 DIAGNOSIS — E78.00 ELEVATED CHOLESTEROL: ICD-10-CM

## 2023-10-10 DIAGNOSIS — N45.1 EPIDIDYMITIS, LEFT: ICD-10-CM

## 2023-10-10 DIAGNOSIS — Z12.11 SCREEN FOR COLON CANCER: ICD-10-CM

## 2023-10-10 DIAGNOSIS — I10 ESSENTIAL HYPERTENSION: ICD-10-CM

## 2023-10-10 LAB
ALBUMIN SERPL-MCNC: 3.8 G/DL (ref 3.4–5)
ALBUMIN/GLOB SERPL: 0.9 {RATIO} (ref 1–2)
ALP LIVER SERPL-CCNC: 84 U/L
ALT SERPL-CCNC: 74 U/L
ANION GAP SERPL CALC-SCNC: 9 MMOL/L (ref 0–18)
AST SERPL-CCNC: 27 U/L (ref 15–37)
BILIRUB SERPL-MCNC: 0.6 MG/DL (ref 0.1–2)
BUN BLD-MCNC: 12 MG/DL (ref 7–18)
CALCIUM BLD-MCNC: 9.5 MG/DL (ref 8.5–10.1)
CHLORIDE SERPL-SCNC: 105 MMOL/L (ref 98–112)
CHOLEST SERPL-MCNC: 179 MG/DL (ref ?–200)
CO2 SERPL-SCNC: 23 MMOL/L (ref 21–32)
COMPLEXED PSA SERPL-MCNC: 0.65 NG/ML (ref ?–4)
CREAT BLD-MCNC: 0.86 MG/DL
EGFRCR SERPLBLD CKD-EPI 2021: 103 ML/MIN/1.73M2 (ref 60–?)
FASTING PATIENT LIPID ANSWER: YES
FASTING STATUS PATIENT QL REPORTED: YES
GLOBULIN PLAS-MCNC: 4.2 G/DL (ref 2.8–4.4)
GLUCOSE BLD-MCNC: 166 MG/DL (ref 70–99)
HDLC SERPL-MCNC: 32 MG/DL (ref 40–59)
LDLC SERPL CALC-MCNC: 113 MG/DL (ref ?–100)
NONHDLC SERPL-MCNC: 147 MG/DL (ref ?–130)
OSMOLALITY SERPL CALC.SUM OF ELEC: 288 MOSM/KG (ref 275–295)
POTASSIUM SERPL-SCNC: 4.2 MMOL/L (ref 3.5–5.1)
PROT SERPL-MCNC: 8 G/DL (ref 6.4–8.2)
SODIUM SERPL-SCNC: 137 MMOL/L (ref 136–145)
TRIGL SERPL-MCNC: 193 MG/DL (ref 30–149)
VLDLC SERPL CALC-MCNC: 33 MG/DL (ref 0–30)

## 2023-10-10 PROCEDURE — 99214 OFFICE O/P EST MOD 30 MIN: CPT | Performed by: FAMILY MEDICINE

## 2023-10-10 PROCEDURE — 83036 HEMOGLOBIN GLYCOSYLATED A1C: CPT | Performed by: FAMILY MEDICINE

## 2023-10-10 PROCEDURE — 3075F SYST BP GE 130 - 139MM HG: CPT | Performed by: FAMILY MEDICINE

## 2023-10-10 PROCEDURE — 80053 COMPREHEN METABOLIC PANEL: CPT | Performed by: FAMILY MEDICINE

## 2023-10-10 PROCEDURE — 3079F DIAST BP 80-89 MM HG: CPT | Performed by: FAMILY MEDICINE

## 2023-10-10 PROCEDURE — 80061 LIPID PANEL: CPT | Performed by: FAMILY MEDICINE

## 2023-10-10 PROCEDURE — 84153 ASSAY OF PSA TOTAL: CPT | Performed by: FAMILY MEDICINE

## 2023-10-10 RX ORDER — DOXYCYCLINE HYCLATE 100 MG
100 TABLET ORAL 2 TIMES DAILY
Qty: 20 TABLET | Refills: 0 | Status: SHIPPED | OUTPATIENT
Start: 2023-10-10 | End: 2023-10-20

## 2023-10-10 NOTE — PATIENT INSTRUCTIONS
1. LLQ abdominal pain  I discussed possible diagnosis and contributing factors  Would recommend colonoscopy. Patient referred to Dr. Kaylin Dee at his request  - SURGERY - INTERNAL    2. Epididymitis, left  Discussed likely contributing factors for epididymitis. Increased daily fluid intake, avoid excessive caffeine and alcohol  We will start doxycycline 100 mg before breakfast and dinner x10 days    3. Screen for colon cancer  Reviewed current recommendation for colon cancer screening. Patient referred to Dr. Kaylin Dee for colonoscopy  - SURGERY - INTERNAL    4. Screening for prostate cancer  Continue annual surveillance  - PSA, Total (Screening) [E]; Future    5. Pre-diabetes  Check labs. Discussed importance of weight loss as well as goals for fasting and postmeal blood sugars, hemoglobin A1c. Discussed importance of routine foot exams and good fitting footwear as well as routine dilated retinal exams  - Comp Metabolic Panel (14) [E]; Future  - Hemoglobin A1C [E]; Future    6. Essential hypertension  I reviewed goals for blood pressure as well as conservative management of hypertension including sodium restriction, daily aerobic activity, alcohol moderation, smoking cessation, and maintaining ideal body weight. Continue current meds and monitoring  - Comp Metabolic Panel (14) [E]; Future    7. Elevated cholesterol  Reviewed goals for lipids  - Lipid Panel [E]; Future  - Comp Metabolic Panel (14) [E];  Future

## 2023-10-11 DIAGNOSIS — E78.00 ELEVATED CHOLESTEROL: ICD-10-CM

## 2023-10-11 DIAGNOSIS — I10 ESSENTIAL HYPERTENSION: ICD-10-CM

## 2023-10-11 DIAGNOSIS — E11.9 TYPE 2 DIABETES MELLITUS WITHOUT COMPLICATION, WITHOUT LONG-TERM CURRENT USE OF INSULIN (HCC): Primary | ICD-10-CM

## 2023-10-11 LAB
EST. AVERAGE GLUCOSE BLD GHB EST-MCNC: 160 MG/DL (ref 68–126)
HBA1C MFR BLD: 7.2 % (ref ?–5.7)

## 2023-10-19 ENCOUNTER — OFFICE VISIT (OUTPATIENT)
Facility: LOCATION | Age: 53
End: 2023-10-19

## 2023-10-19 VITALS — HEART RATE: 87 BPM | TEMPERATURE: 97 F

## 2023-10-19 DIAGNOSIS — R10.32 LEFT GROIN PAIN: Primary | ICD-10-CM

## 2023-10-19 PROCEDURE — 99244 OFF/OP CNSLTJ NEW/EST MOD 40: CPT | Performed by: SURGERY

## 2023-10-19 RX ORDER — POLYETHYLENE GLYCOL 3350, SODIUM CHLORIDE, SODIUM BICARBONATE, POTASSIUM CHLORIDE 420; 11.2; 5.72; 1.48 G/4L; G/4L; G/4L; G/4L
POWDER, FOR SOLUTION ORAL
Qty: 1 EACH | Refills: 0 | Status: SHIPPED | OUTPATIENT
Start: 2023-10-19

## 2023-10-25 ENCOUNTER — HOSPITAL ENCOUNTER (OUTPATIENT)
Dept: CT IMAGING | Age: 53
Discharge: HOME | End: 2023-10-25
Attending: SURGERY

## 2023-10-25 DIAGNOSIS — R10.32 LEFT GROIN PAIN: ICD-10-CM

## 2023-10-25 PROCEDURE — 72193 CT PELVIS W/DYE: CPT | Performed by: SURGERY

## 2023-11-07 ENCOUNTER — TELEPHONE (OUTPATIENT)
Facility: LOCATION | Age: 53
End: 2023-11-07

## 2023-11-07 DIAGNOSIS — K42.9 UMBILICAL HERNIA WITHOUT OBSTRUCTION OR GANGRENE: Primary | ICD-10-CM

## 2023-11-21 ENCOUNTER — TELEPHONE (OUTPATIENT)
Dept: ADMINISTRATIVE | Age: 53
End: 2023-11-21

## 2023-11-21 NOTE — TELEPHONE ENCOUNTER
Returned pt's call. He will be emailing his FMLA forms to our dept & will send his ROLF via fax.    Forms dept information given to pt.    Pt states forms are for his upcoming sx on 2/5/24. Pt is unsure how long he will be off of work (possibility of 2-6 weeks).   Other hospital/facility/[facility]

## 2023-11-27 NOTE — TELEPHONE ENCOUNTER
FMLA received. Called pt in regards to missing ROLF as pt does not have MyChart. Pt will be faxing ROLF/FCR, fax provided.

## 2023-12-12 DIAGNOSIS — I10 ESSENTIAL HYPERTENSION: ICD-10-CM

## 2023-12-12 RX ORDER — LISINOPRIL 20 MG/1
20 TABLET ORAL DAILY
Qty: 90 TABLET | Refills: 1 | Status: SHIPPED | OUTPATIENT
Start: 2023-12-12

## 2023-12-12 NOTE — TELEPHONE ENCOUNTER
Last refill: 6/19/23 #90 w/ 1 refill    Last OV: 10/10/23  Last labs: 10/10/23    Future Appointments   Date Time Provider Sharri Troy   1/16/2024  8:00 AM Sampson Dears., DO [facility]

## 2023-12-22 NOTE — TELEPHONE ENCOUNTER
Dr. Doe,      *The ACKNOWLEDGE button has been moved to the top right ribbon*    Please sign off on form if you agree to: FMLA  (place your signature on the first page only)    -From your Inbasket, Highlight the patient and click Chart   -Double click the 11/21/2023 Forms Completion telephone encounter  -Scroll down to the Media section   -Click the blue Hyperlink: MAYELIN Doe 12/22/2023  -Click Acknowledge located in the top right ribbon/menu   -Drag the mouse into the blank space of the document and a + sign will appear. Left click to   electronically sign the document.     Thank you,  Jessica ESPINOZA

## 2024-01-03 NOTE — TELEPHONE ENCOUNTER
Dr. Doe,     2nd Attempt      *The ACKNOWLEDGE button has been moved to the top right ribbon*     Please sign off on form if you agree to: FMLA  (place your signature on the first page only)     -From your Inbasket, Highlight the patient and click Chart   -Double click the 11/21/2023 Forms Completion telephone encounter  -Scroll down to the Media section   -Click the blue Hyperlink: MAYELIN Doe 12/22/2023  -Click Acknowledge located in the top right ribbon/menu   -Drag the mouse into the blank space of the document and a + sign will appear. Left click to   electronically sign the document.     Thank you,  Jessica ESPINOZA

## 2024-01-05 NOTE — TELEPHONE ENCOUNTER
Dr. Doe; Keri SONI     2nd Attempt      *The ACKNOWLEDGE button has been moved to the top right ribbon*     Please sign off on form if you agree to: FMLA  (place your signature on the first page only)     -From your Inbasket, Highlight the patient and click Chart   -Double click the 11/21/2023 Forms Completion telephone encounter  -Scroll down to the Media section   -Click the blue Hyperlink: MAYELIN Doe 12/22/2023  -Click Acknowledge located in the top right ribbon/menu   -Drag the mouse into the blank space of the document and a + sign will appear. Left click to   electronically sign the document.     Thank you,  Jessica ESPINOZA

## 2024-01-08 NOTE — TELEPHONE ENCOUNTER
Kirt Saavedra,     It's been moved to the Top RIGHT Ribbon of the req.. hope that helps..    Dr. Doe; Keri Dotson PA     2nd Attempt      *The ACKNOWLEDGE button has been moved to the top right ribbon*     Please sign off on form if you agree to: FMLA  (place your signature on the first page only)     -From your Inbasket, Highlight the patient and click Chart   -Double click the 11/21/2023 Forms Completion telephone encounter  -Scroll down to the Media section   -Click the blue Hyperlink: MAYELIN Doe 12/22/2023  -Click Acknowledge located in the top right ribbon/menu   -Drag the mouse into the blank space of the document and a + sign will appear. Left click to   electronically sign the document.     Thank you,  Jessica ESPINOZA

## 2024-01-09 NOTE — TELEPHONE ENCOUNTER
My apologies, I get what you were trying to say before now, I have now re-scanned the req and indexed under your name and not Dr. Doe's. The acknowledgement button should be available to you now, again my apologies.     Keri Dotson     *The ACKNOWLEDGE button has been moved to the top right ribbon*    Please sign off on form if you agree to:  FMLA   (place your signature on the first page only)    -From your Inbasket, Highlight the patient and click Chart   -Double click the 11/21/2023 Forms Completion telephone encounter  -Scroll down to the Media section   -Click the blue Hyperlink: MAYELIN Doe/Keri Dotson 01/09/2024  -Click Acknowledge located in the top right ribbon/menu   -Drag the mouse into the blank space of the document and a + sign will appear. Left click to   electronically sign the document.     Thank you,  Jessica ESPINOZA

## 2024-01-10 ENCOUNTER — TELEPHONE (OUTPATIENT)
Dept: FAMILY MEDICINE CLINIC | Facility: CLINIC | Age: 54
End: 2024-01-10

## 2024-01-10 NOTE — TELEPHONE ENCOUNTER
Pt scheduled for pre-op px on 1/16/24.  Orders in blue folder.    Pt's FMLA paperwork that was completed by Dr. Doe (scanned into Epic) printed and left @ f/d per pt's request

## 2024-01-10 NOTE — TELEPHONE ENCOUNTER
Received several error messages when trying to fax, confirmed number was correct, cld pt. 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 pt states he will go to local [facility] office and ask to have printed.. pt expressed thanks and understanding for helping him in lieu of being able to fax/ upload to [employer]...

## 2024-01-10 NOTE — TELEPHONE ENCOUNTER
Pt called and is requesting [facility] paperwork be printed and a medical history release for a hernia surgery scheduled for 2/5/24.

## 2024-01-15 ENCOUNTER — TELEPHONE (OUTPATIENT)
Facility: LOCATION | Age: 54
End: 2024-01-15

## 2024-01-15 NOTE — TELEPHONE ENCOUNTER
Transaction ID: [id number] ID: [id number] Date: 2024-01-15  NORMA TENA Patient  Member ID  [id number]    Date of Birth  1970-03-19    Gender  Male    Transaction Type  Outpatient Authorization    Organization  [facility]    Payer  [facility] logo     Certificate Information  Reference Number  [id number]    Status  NO ACTION REQUIRED    Message  Requested Service does not require preauthorization. We would strongly encourage you to check benefits for this service.    Member Information  Patient Name  NORMA TENA    Patient Date of Birth  1970-03-19    Patient Gender  Male    Member ID  [id number]    Relationship to Subscriber  Self    Subscriber Name  NORMA TENA    Requesting Provider     Name  RAFAEL MELTON    NPI  [id number]    Tax Id  [id number]    Specialty  [id number]  Provider Role  Provider    Address  [address] [place] [address]    Phone  [phone]  Fax  [phone]    Contact Name  DIANNA MANUEL    Service Information  Service Type  2 - Surgical    [facility]    Service From - To Date  2024-01-25 - 2024-02-28    Level of Service  Elective    Diagnosis Code 1   - Encounter for screening for malignant neoplasm of colon    Procedure Code 1 (CPT/HCPCS)  35524 - DIAGNOSTIC COLONOSCOPY    Quantity  1 Units    Status  NO ACTION REQUIRED    Rendering Provider/Facility     Provider 1  Name  RAFAEL MELTON    NPI  [id number]    Specialty  [id number]  Provider Role  Attending    Address  [address] [place] [address]    Provider 2  Name  [facility]    NPI  [id number]    Provider Role  Facility    Address  [address] [place] [address]

## 2024-01-16 ENCOUNTER — LABORATORY ENCOUNTER (OUTPATIENT)
Dept: LAB | Age: 54
End: 2024-01-16
Attending: FAMILY MEDICINE
Payer: COMMERCIAL

## 2024-01-16 ENCOUNTER — OFFICE VISIT (OUTPATIENT)
Dept: FAMILY MEDICINE CLINIC | Facility: CLINIC | Age: 54
End: 2024-01-16
Payer: COMMERCIAL

## 2024-01-16 VITALS
DIASTOLIC BLOOD PRESSURE: 88 MMHG | TEMPERATURE: 97 F | HEIGHT: 68 IN | WEIGHT: 230.63 LBS | HEART RATE: 95 BPM | BODY MASS INDEX: 34.95 KG/M2 | OXYGEN SATURATION: 99 % | RESPIRATION RATE: 18 BRPM | SYSTOLIC BLOOD PRESSURE: 126 MMHG

## 2024-01-16 DIAGNOSIS — I10 ESSENTIAL HYPERTENSION: ICD-10-CM

## 2024-01-16 DIAGNOSIS — E78.2 DYSLIPIDEMIA WITH LOW HIGH DENSITY LIPOPROTEIN (HDL) CHOLESTEROL WITH HYPERTRIGLYCERIDEMIA DUE TO TYPE 2 DIABETES MELLITUS  (HCC): ICD-10-CM

## 2024-01-16 DIAGNOSIS — E11.9 TYPE 2 DIABETES MELLITUS WITHOUT COMPLICATION, WITHOUT LONG-TERM CURRENT USE OF INSULIN (HCC): ICD-10-CM

## 2024-01-16 DIAGNOSIS — E11.69 DYSLIPIDEMIA WITH LOW HIGH DENSITY LIPOPROTEIN (HDL) CHOLESTEROL WITH HYPERTRIGLYCERIDEMIA DUE TO TYPE 2 DIABETES MELLITUS  (HCC): ICD-10-CM

## 2024-01-16 DIAGNOSIS — K76.0 FATTY LIVER: ICD-10-CM

## 2024-01-16 DIAGNOSIS — Z23 NEED FOR VACCINATION: ICD-10-CM

## 2024-01-16 DIAGNOSIS — G47.33 OSA (OBSTRUCTIVE SLEEP APNEA): ICD-10-CM

## 2024-01-16 DIAGNOSIS — E78.00 ELEVATED CHOLESTEROL: ICD-10-CM

## 2024-01-16 DIAGNOSIS — Z01.818 PRE-OP EVALUATION: ICD-10-CM

## 2024-01-16 DIAGNOSIS — K42.9 UMBILICAL HERNIA WITHOUT OBSTRUCTION AND WITHOUT GANGRENE: ICD-10-CM

## 2024-01-16 DIAGNOSIS — Z80.8 FH: MELANOMA: ICD-10-CM

## 2024-01-16 DIAGNOSIS — Z01.818 PRE-OP EVALUATION: Primary | ICD-10-CM

## 2024-01-16 DIAGNOSIS — E66.9 OBESITY (BMI 30.0-34.9): ICD-10-CM

## 2024-01-16 LAB
ALBUMIN SERPL-MCNC: 3.9 G/DL (ref 3.4–5)
ALBUMIN/GLOB SERPL: 1.1 {RATIO} (ref 1–2)
ALP LIVER SERPL-CCNC: 85 U/L
ALT SERPL-CCNC: 75 U/L
ANION GAP SERPL CALC-SCNC: 7 MMOL/L (ref 0–18)
AST SERPL-CCNC: 25 U/L (ref 15–37)
ATRIAL RATE: 89 BPM
BILIRUB SERPL-MCNC: 0.5 MG/DL (ref 0.1–2)
BUN BLD-MCNC: 14 MG/DL (ref 9–23)
CALCIUM BLD-MCNC: 9.3 MG/DL (ref 8.5–10.1)
CHLORIDE SERPL-SCNC: 105 MMOL/L (ref 98–112)
CHOLEST SERPL-MCNC: 179 MG/DL (ref ?–200)
CO2 SERPL-SCNC: 24 MMOL/L (ref 21–32)
CREAT BLD-MCNC: 0.94 MG/DL
EGFRCR SERPLBLD CKD-EPI 2021: 96 ML/MIN/1.73M2 (ref 60–?)
EST. AVERAGE GLUCOSE BLD GHB EST-MCNC: 194 MG/DL (ref 68–126)
FASTING PATIENT LIPID ANSWER: YES
FASTING STATUS PATIENT QL REPORTED: YES
GLOBULIN PLAS-MCNC: 3.6 G/DL (ref 2.8–4.4)
GLUCOSE BLD-MCNC: 201 MG/DL (ref 70–99)
HBA1C MFR BLD: 8.4 % (ref ?–5.7)
HDLC SERPL-MCNC: 35 MG/DL (ref 40–59)
LDLC SERPL CALC-MCNC: 114 MG/DL (ref ?–100)
NONHDLC SERPL-MCNC: 144 MG/DL (ref ?–130)
OSMOLALITY SERPL CALC.SUM OF ELEC: 288 MOSM/KG (ref 275–295)
P AXIS: 44 DEGREES
P-R INTERVAL: 172 MS
POTASSIUM SERPL-SCNC: 4.4 MMOL/L (ref 3.5–5.1)
PROT SERPL-MCNC: 7.5 G/DL (ref 6.4–8.2)
Q-T INTERVAL: 348 MS
QRS DURATION: 92 MS
QTC CALCULATION (BEZET): 423 MS
R AXIS: 53 DEGREES
SODIUM SERPL-SCNC: 136 MMOL/L (ref 136–145)
T AXIS: 35 DEGREES
TRIGL SERPL-MCNC: 171 MG/DL (ref 30–149)
VENTRICULAR RATE: 89 BPM
VLDLC SERPL CALC-MCNC: 30 MG/DL (ref 0–30)

## 2024-01-16 PROCEDURE — 80061 LIPID PANEL: CPT

## 2024-01-16 PROCEDURE — 3079F DIAST BP 80-89 MM HG: CPT | Performed by: FAMILY MEDICINE

## 2024-01-16 PROCEDURE — 3008F BODY MASS INDEX DOCD: CPT | Performed by: FAMILY MEDICINE

## 2024-01-16 PROCEDURE — 90471 IMMUNIZATION ADMIN: CPT | Performed by: OBSTETRICS & GYNECOLOGY

## 2024-01-16 PROCEDURE — 80053 COMPREHEN METABOLIC PANEL: CPT

## 2024-01-16 PROCEDURE — 99244 OFF/OP CNSLTJ NEW/EST MOD 40: CPT | Performed by: FAMILY MEDICINE

## 2024-01-16 PROCEDURE — 93000 ELECTROCARDIOGRAM COMPLETE: CPT | Performed by: FAMILY MEDICINE

## 2024-01-16 PROCEDURE — 83036 HEMOGLOBIN GLYCOSYLATED A1C: CPT

## 2024-01-16 PROCEDURE — 90686 IIV4 VACC NO PRSV 0.5 ML IM: CPT | Performed by: OBSTETRICS & GYNECOLOGY

## 2024-01-16 PROCEDURE — 3074F SYST BP LT 130 MM HG: CPT | Performed by: FAMILY MEDICINE

## 2024-01-16 NOTE — H&P
Juve Carter is a 53 year old male   Chief Complaint   Patient presents with    Pre-Op Exam       HPI:   Pt presents for preoperative evaluation as requested by Dr. Jonathan Doe.  Patient is scheduled for an umbilical herniorrhaphy on 2/5/2024 at [facility] under general anesthesia..  Also having colonoscopy 1/25/24 @ [facility]  Pt was sick over [place], still has a cough and sinus congestion- getting better,covid tests negative but co-worker had covid   Wt Readings from Last 6 Encounters:   01/16/24 230 lb 9.6 oz (104.6 kg)   01/03/24 225 lb (102.1 kg)   10/10/23 230 lb (104.3 kg)   10/25/22 228 lb (103.4 kg)   04/05/22 222 lb (100.7 kg)   05/05/21 228 lb (103.4 kg)     Body mass index is 35.06 kg/m².     Cholesterol, Total (mg/dL)   Date Value   10/10/2023 179   04/05/2022 196   05/05/2021 191     HDL Cholesterol (mg/dL)   Date Value   10/10/2023 32 (L)   04/05/2022 35 (L)   05/05/2021 36 (L)     LDL Cholesterol (mg/dL)   Date Value   10/10/2023 113 (H)   04/05/2022 116 (H)   05/05/2021 127 (H)     AST (U/L)   Date Value   10/10/2023 27   04/05/2022 27   05/05/2021 31     ALT (U/L)   Date Value   10/10/2023 74 (H)   04/05/2022 58   05/05/2021 58      Current Outpatient Medications   Medication Sig Dispense Refill    lisinopril 20 MG Oral Tab Take 1 tablet (20 mg total) by mouth daily. 90 tablet 1    PEG 3350-KCl-Na Bicarb-NaCl (TRILYTE) 420 g Oral Recon Soln Starting at 4:00 pm the night before procedure, drink 8 ounces of the prep every 15-20 minutes until finished (Patient not taking: Reported on 1/16/2024) 1 each 0     Allergies   Allergen Reactions    Metformin DIARRHEA        Past Medical History:   Diagnosis Date    Fatty liver     H/O herpes genitalis     HTN (hypertension)     Lactose intolerance     Pre-diabetes       Past Surgical History:   Procedure Laterality Date    HERNIA SURGERY      right inguinal herniorrhaphy-laprascopic    MYRINGOTOMY, LASER-ASSISTED      NUC  STRESS TEST, CARDIO (DMG)  6/1/11    NEGATIVE    OTHER SURGICAL HISTORY      PE TUBES    SKIN SURGERY      REMOVAL BENIGN MASS RIGHT FOREARM      Family History   Problem Relation Age of Onset    Cancer Father         METASTATIC MELANOMA TO BRAIN    Hypertension Father     Cancer Mother         SKIN    Cancer Sister 43        BREAST        Social History     Socioeconomic History    Marital status:    Tobacco Use    Smoking status: Never    Smokeless tobacco: Never   Vaping Use    Vaping Use: Never used   Substance and Sexual Activity    Alcohol use: Yes     Comment: RARE    Drug use: No   Other Topics Concern    Caffeine Concern No    Exercise No    Seat Belt Yes    Special Diet No    Stress Concern No    Weight Concern No      Occ:  for the [employer] district. : +. Children: one son 17.   Exercise: minimal.  Diet: low carb diet, limiting fast food     REVIEW OF SYSTEMS:rarely   GENERAL: generally feels well, no fatigue, denies excessive daytime drowsiness, good appetite  SKIN: denies any unusual skin lesions or rashes  EYES:denies blurred vision or double vision, glasses/ contacts: reading glasses, last eye exam:needs to schedule  ENT: + nasal congestion,- scant, clear, PND denies ST, + snoring and reported periods of apnea, denies hearing deficits  LUNGS: denies shortness of breath with exertion,+ occasional cough, clear sputum, no wheezing or shortness of breath  CARDIOVASCULAR: denies chest pain on exertion, palpations, anginal equivalent symptoms, no claudication , no peripheral edema, pt rarely checking bp, 135/87 today  GI: denies abdominal pain,denies heartburn, constipation, diarrhea, or change in bowel habits  : denies dysuria, hesitancy,nocturia, decreased urine stream, incontinence, erectile dysfunction, decreased libido   MUSCULOSKELETAL: denies back or joint pain  NEURO: denies headaches, tremors, dizziness, numbness and weakness  PSYCHE: denies depression or anxiety,  denies any sleep difficulty,   HEMATOLOGIC: denies unexplained bruising or bleeding  ENDOCRINE: denies heat or cold intolerance , no unexplained wt loss or gain, pt not checking bs, stopped metformin 2 months ago,dizzy, loose stools  EXAM:   /88 (BP Location: Left arm, Patient Position: Sitting, Cuff Size: large)   Pulse 95   Temp 97.1 °F (36.2 °C) (Temporal)   Resp 18   Ht 5' 8\" (1.727 m)   Wt 230 lb 9.6 oz (104.6 kg)   SpO2 99%   BMI 35.06 kg/m²   Body mass index is 35.06 kg/m².   GENERAL: +obese male,  no apparent distress  SKIN: no rashes,no suspicious lesions  ENT: EAC:  Clear, TMs: intact, Nose: turbinates slight congestion, septum: midline, discharge: none seen, Pharynx: Class III airway, no oral lesions  EYES:PERRLA, EOMI, normal optic disk,conjunctiva are clear  NECK: supple,no adenopathy,no bruits, no thyromegaly  LUNGS: clear to auscultation, no w/r/r  CARDIO: RRR without murmur, no S3, S4, strong peripheral pulses, no peripheral edema  GI: +NBS's,no masses, HSM or tenderness, reducible umbilical hernia  BACK:  : FROM, No lateral curves  EXTREMITIES: no cyanosis, clubbing or edema, FROM of all joints tested  NEURO: A &O X 3,cranial nerves are intact,motor and sensory are grossly intact, DTRs +2/4 UE/LE bilaterally  PSYCH: affect: slightly anxious, speech: clear and coherent, Insight: appropriate  EKG: Normal sinus rhythm, heart rate 89, NC interval 0.17, QRS interval 0.09, QRS axis +53 degrees, no ischemic changes noted, normal EKG  ASSESSMENT AND PLAN:   Juve Carter is a 53 year old male  Encounter Diagnoses   Name Primary?    Pre-op evaluation Yes    Umbilical hernia without obstruction and without gangrene     Essential hypertension     Type 2 diabetes mellitus without complication, without long-term current use of insulin (HCC)     Dyslipidemia with low high density lipoprotein (HDL) cholesterol with hypertriglyceridemia due to type 2 diabetes mellitus  (HCC)     Obesity (BMI  30.0-34.9)     FH: melanoma     Fatty liver     BRYAN (obstructive sleep apnea)- clinical suspicion      Orders Placed This Encounter   Procedures    Hemoglobin A1C [E]    Lipid Panel [E]    Basic Metabolic Panel (8) [E]    Microalb/Creat Ratio, Random Urine [E]     I advised patient that his cough is likely due to residual postnasal drip.  May use nasal saline and Delsym DM as needed, if cough persists an additional 7 to 10 days, may empirically prescribe antibiotics for presumed sinusitis  Patient advised to avoid all aspirin and NSAID containing products including ibuprofen, Motrin, Aleve, naproxen 7 to 10 days prior to planned procedures  Will check preoperative labs.  Patient strongly encouraged to schedule dilated retinal exam and glaucoma screening.  Reviewed age-appropriate mutations.  Tdap, pneumococcal and Shingrix vaccine declined, flu vaccine provided  I reviewed signs and symptoms of obstructive sleep apnea as well as cardiovascular complication of untreated disease.  I would recommend a sleep study however patient wishes to defer a sleep study until later this year.  Sedated ventilatory status will need to be closely monitored due to clinical suspicion for obstructive sleep apnea.  Pending review of labs and resolution of cough, patient is medically cleared for procedure as scheduled and may proceed with surgery as planned.  Copy of this consultation be forwarded to Dr. Doe for review    Lio Szymanski DO, FAAFP

## 2024-01-17 DIAGNOSIS — E11.9 TYPE 2 DIABETES MELLITUS WITHOUT COMPLICATION, WITHOUT LONG-TERM CURRENT USE OF INSULIN (HCC): Primary | ICD-10-CM

## 2024-01-17 RX ORDER — GLUCOSAMINE HCL/CHONDROITIN SU 500-400 MG
CAPSULE ORAL
Qty: 100 STRIP | Refills: 3 | Status: SHIPPED | OUTPATIENT
Start: 2024-01-17

## 2024-01-17 RX ORDER — LANCETS 30 GAUGE
EACH MISCELLANEOUS
Qty: 100 EACH | Refills: 3 | Status: SHIPPED | OUTPATIENT
Start: 2024-01-17

## 2024-01-24 ENCOUNTER — TELEPHONE (OUTPATIENT)
Facility: LOCATION | Age: 54
End: 2024-01-24

## 2024-01-24 ENCOUNTER — TELEPHONE (OUTPATIENT)
Dept: FAMILY MEDICINE CLINIC | Facility: CLINIC | Age: 54
End: 2024-01-24

## 2024-01-24 NOTE — TELEPHONE ENCOUNTER
Spoke with pt. He started sitagliptin 100mg on 1/18/24.  States he takes it at 3pm, then develops a h/a 1-2 hours later. Pain is in forehead, back of head and base of neck.   Pt works from 1pm--9:30pm.  He asks if he can take this medication @ night when he gets home from work, so he doesn't have to work with a h/a. Said he'd rather sleep through it.  Asked pt if he took anything for the h/a? Pt replied no, he doesn't like to take medication.  Pt reports his h/a was less severe last night, so maybe it will improve/ resolve the longer he's on it?    Is this a normal side effect?  Okay to take right before bed?

## 2024-01-24 NOTE — TELEPHONE ENCOUNTER
Headache is not a typical side effect of Sitagliptin.  He can start with 1/2 tablet nightly for 1 to 2 weeks then increase to a full tablet nightly to allow his body to get used to it.  Call or follow-up if headaches persist   Linear

## 2024-01-24 NOTE — TELEPHONE ENCOUNTER
Pt would like for nurse to call him back he has questions in regards to medications that he is currently taking.

## 2024-01-24 NOTE — TELEPHONE ENCOUNTER
Transaction ID: [id number] ID: [id number] Date: 2024-01-24  NORMA TENA Patient  Member ID  [id number]    Date of Birth  1970-03-19    Gender  Male    Transaction Type  Outpatient Authorization    Organization  [facility]    Payer  [facility] logo     Certificate Information  Reference Number  [id number]    Status  NO ACTION REQUIRED    Message  Requested Service does not require preauthorization. We would strongly encourage you to check benefits for this service.    Member Information  Patient Name  NORMA TENA    Patient Date of Birth  1970-03-19    Patient Gender  Male    Member ID  [id number]    Relationship to Subscriber  Self    Subscriber Name  NORMA TENA    Requesting Provider     Name  RAFAEL MELTON    NPI  [id number]    Tax Id  [id number]    Specialty  [id number]  Provider Role  Provider    Address  [address] [place] [address]    Phone  [phone]  Fax  [phone]    Contact Name  DIANNA MANUEL    Service Information  Service Type  2 - Surgical    Place of Service  [facility] - [facility]    Service From - To Date  2024-02-05 - 2024-03-27    Level of Service  Elective    Diagnosis Code 1  K429 - Umbilical hernia without obstruction or gangrene    Procedure Code 1 (CPT/HCPCS)  08019 - RPR AA HRN 1ST < 3 CM RDC    Quantity  1 Units    Status  NO ACTION REQUIRED    Rendering Provider/Facility     Provider 1  Name  RAFAEL MELTON    NPI  [id number]    Specialty  [id number]  Provider Role  Attending    Address  [address] [place] [address]    Provider 2  Name  [facility]    NPI  [id number]    Provider Role  Facility    Address  [address] [place] [address]

## 2024-02-02 ENCOUNTER — TELEPHONE (OUTPATIENT)
Facility: LOCATION | Age: 54
End: 2024-02-02

## 2024-02-02 DIAGNOSIS — K42.9 UMBILICAL HERNIA WITHOUT OBSTRUCTION OR GANGRENE: Primary | ICD-10-CM

## 2024-02-02 NOTE — TELEPHONE ENCOUNTER
PT said he called 2-3 weeks ago into the office and left a VM regarding cancelling the procedure. He was recently diagnosed with diabetes and his pcp wanted him to wait a few months. He will call back and reschedule potentially after meeting with pcp.

## 2024-02-21 ENCOUNTER — TELEPHONE (OUTPATIENT)
Dept: FAMILY MEDICINE CLINIC | Facility: CLINIC | Age: 54
End: 2024-02-21

## 2024-02-21 NOTE — TELEPHONE ENCOUNTER
Per Dr. Szymanski notes from lab results 01/2024 - pt to continue taking Januvia 100mg - pt states has no refills but refills were sent 01/17/24 #90 +1 rf    Pt informed, V/U

## 2024-02-26 ENCOUNTER — TELEPHONE (OUTPATIENT)
Dept: FAMILY MEDICINE CLINIC | Facility: CLINIC | Age: 54
End: 2024-02-26

## 2024-02-26 NOTE — TELEPHONE ENCOUNTER
Rec'd fax from [facility] re: Januvia RF request.  Apparently they don't have the most recent RF we sent for #90 on 1/17.    Refill sent again

## 2024-03-07 ENCOUNTER — TELEPHONE (OUTPATIENT)
Facility: LOCATION | Age: 54
End: 2024-03-07

## 2024-03-07 NOTE — TELEPHONE ENCOUNTER
NORMA TENA Patient  Member ID  [id number]    Date of Birth  1970-03-19    Gender  Male    Transaction Type  Outpatient Authorization    Organization  [facility]    Payer  [facility] logo     Certificate Information  Reference Number  [id number]    Status  NO ACTION REQUIRED    Message  Requested Service does not require preauthorization. We would strongly encourage you to check benefits for this service.    Member Information  Patient Name  NORMA TENA    Patient Date of Birth  1970-03-19    Patient Gender  Male    Member ID  [id number]    Relationship to Subscriber  Self    Subscriber Name  NORMA TENA    Requesting Provider     Name  RAFAEL MELTON    NPI  [id number]    Tax Id  [id number]    Specialty  [id number]  Provider Role  Provider    Address  [address] [place] [address]    Phone  [phone]  Fax  [phone]    Contact Name  DIANNA MANUEL    Service Information  Service Type  2 - Surgical    [facility]    Service From - To Date  2024-03-20 - 2024-06-26    Level of Service  Elective    Diagnosis Code 1   - Encounter for screening for malignant neoplasm of colon    Procedure Code 1 (CPT/HCPCS)  62831 - DIAGNOSTIC COLONOSCOPY    Quantity  1 Units    Status  NO ACTION REQUIRED    Rendering Provider/Facility     Provider 1  Name  GERONIMO RAFAEL    NPI  [id number]    Specialty  [id number]  Provider Role  Attending    Address  [address] [place] [address]    Provider 2  Name  [facility]    NPI  [id number]    Provider Role  Facility    Address  [address] , [place] [address]

## 2024-03-20 ENCOUNTER — LAB REQUISITION (OUTPATIENT)
Age: 54
End: 2024-03-20
Payer: COMMERCIAL

## 2024-03-20 DIAGNOSIS — Z12.11 COLON CANCER SCREENING: ICD-10-CM

## 2024-03-20 PROCEDURE — 88305 TISSUE EXAM BY PATHOLOGIST: CPT | Performed by: SURGERY

## 2024-03-27 ENCOUNTER — TELEPHONE (OUTPATIENT)
Facility: LOCATION | Age: 54
End: 2024-03-27

## 2024-03-27 NOTE — TELEPHONE ENCOUNTER
----- Message from Jonathan Doe DO sent at 3/27/2024  9:18 AM CDT -----  Recall colonoscopy 10 years  ---------------    10-year recall placed, and Care Gaps updated

## 2024-03-28 ENCOUNTER — TELEPHONE (OUTPATIENT)
Facility: LOCATION | Age: 54
End: 2024-03-28

## 2024-03-28 ENCOUNTER — PATIENT OUTREACH (OUTPATIENT)
Facility: LOCATION | Age: 54
End: 2024-03-28

## 2024-03-28 NOTE — TELEPHONE ENCOUNTER
Good morning, this patient recently called about his pathology tissue results and state that he has not received a call about it yet. So he would like to receive a call discussing his results.    Call back # [phone]

## 2024-03-28 NOTE — TELEPHONE ENCOUNTER
Pt reviewed message from Dr Doe via [employer].  10 year colonoscopy recall placed and Care Gaps updated.

## 2024-05-23 NOTE — TELEPHONE ENCOUNTER
Last office visit: 1/16/24  Last refill: 2/26/24  Labs Due: 4/17/24  [employer] message sent to patient to schedule a1c  No future appointments.

## 2024-11-20 ENCOUNTER — TELEPHONE (OUTPATIENT)
Dept: FAMILY MEDICINE CLINIC | Facility: CLINIC | Age: 54
End: 2024-11-20

## 2024-11-20 DIAGNOSIS — I10 ESSENTIAL HYPERTENSION: ICD-10-CM

## 2024-11-20 RX ORDER — LISINOPRIL 20 MG/1
20 TABLET ORAL DAILY
Qty: 90 TABLET | Refills: 0 | Status: SHIPPED | OUTPATIENT
Start: 2024-11-20

## 2024-11-20 NOTE — TELEPHONE ENCOUNTER
Last OV 1/16/24  Pt overdue for labs also.    Spoke with pt---advised he is due for OV/ px and labs.  Pt states he is very busy at work right now, so he can't come in until mid-Jan.  Px and labs scheduled on 1/17/25

## 2025-03-01 DIAGNOSIS — I10 ESSENTIAL HYPERTENSION: ICD-10-CM

## 2025-03-01 RX ORDER — LISINOPRIL 20 MG/1
20 TABLET ORAL DAILY
Qty: 30 TABLET | Refills: 0 | Status: SHIPPED | OUTPATIENT
Start: 2025-03-01

## 2025-03-01 NOTE — TELEPHONE ENCOUNTER
OV 01/16/24  LABS 01/16/24    REFILL both medications 11/20/24 #90    No future appointments. [employer] reminder sent to patient to schedule physical and labs    BP Readings from Last 3 Encounters:   01/16/24 126/88   10/10/23 130/86   10/25/22 138/88

## 2025-03-18 ENCOUNTER — OFFICE VISIT (OUTPATIENT)
Dept: FAMILY MEDICINE CLINIC | Facility: CLINIC | Age: 55
End: 2025-03-18
Payer: COMMERCIAL

## 2025-03-18 ENCOUNTER — LABORATORY ENCOUNTER (OUTPATIENT)
Dept: LAB | Age: 55
End: 2025-03-18
Attending: FAMILY MEDICINE
Payer: COMMERCIAL

## 2025-03-18 VITALS
BODY MASS INDEX: 32.88 KG/M2 | OXYGEN SATURATION: 98 % | DIASTOLIC BLOOD PRESSURE: 84 MMHG | WEIGHT: 222 LBS | SYSTOLIC BLOOD PRESSURE: 126 MMHG | HEIGHT: 69 IN | RESPIRATION RATE: 16 BRPM | HEART RATE: 98 BPM | TEMPERATURE: 98 F

## 2025-03-18 DIAGNOSIS — E11.9 TYPE 2 DIABETES MELLITUS WITHOUT COMPLICATION, WITHOUT LONG-TERM CURRENT USE OF INSULIN (HCC): ICD-10-CM

## 2025-03-18 DIAGNOSIS — Z12.5 SCREENING FOR PROSTATE CANCER: ICD-10-CM

## 2025-03-18 DIAGNOSIS — Z80.8 FH: MELANOMA: ICD-10-CM

## 2025-03-18 DIAGNOSIS — I10 ESSENTIAL HYPERTENSION: ICD-10-CM

## 2025-03-18 DIAGNOSIS — K76.0 FATTY LIVER: ICD-10-CM

## 2025-03-18 DIAGNOSIS — E11.69 DYSLIPIDEMIA WITH LOW HIGH DENSITY LIPOPROTEIN (HDL) CHOLESTEROL WITH HYPERTRIGLYCERIDEMIA DUE TO TYPE 2 DIABETES MELLITUS (HCC): ICD-10-CM

## 2025-03-18 DIAGNOSIS — E78.2 DYSLIPIDEMIA WITH LOW HIGH DENSITY LIPOPROTEIN (HDL) CHOLESTEROL WITH HYPERTRIGLYCERIDEMIA DUE TO TYPE 2 DIABETES MELLITUS (HCC): ICD-10-CM

## 2025-03-18 DIAGNOSIS — E66.811 OBESITY (BMI 30.0-34.9): ICD-10-CM

## 2025-03-18 DIAGNOSIS — R06.83 SNORING: ICD-10-CM

## 2025-03-18 DIAGNOSIS — Z23 NEED FOR VACCINATION: ICD-10-CM

## 2025-03-18 DIAGNOSIS — E11.69 TYPE 2 DIABETES MELLITUS WITH OTHER SPECIFIED COMPLICATION, WITHOUT LONG-TERM CURRENT USE OF INSULIN (HCC): ICD-10-CM

## 2025-03-18 DIAGNOSIS — Z00.00 PREVENTATIVE HEALTH CARE: Primary | ICD-10-CM

## 2025-03-18 LAB
ALBUMIN SERPL-MCNC: 4.8 G/DL (ref 3.2–4.8)
ALBUMIN/GLOB SERPL: 1.8 {RATIO} (ref 1–2)
ALP LIVER SERPL-CCNC: 72 U/L
ALT SERPL-CCNC: 64 U/L
ANION GAP SERPL CALC-SCNC: 9 MMOL/L (ref 0–18)
AST SERPL-CCNC: 38 U/L (ref ?–34)
BILIRUB SERPL-MCNC: 0.7 MG/DL (ref 0.3–1.2)
BUN BLD-MCNC: 16 MG/DL (ref 9–23)
CALCIUM BLD-MCNC: 9.5 MG/DL (ref 8.7–10.6)
CHLORIDE SERPL-SCNC: 105 MMOL/L (ref 98–112)
CHOLEST SERPL-MCNC: 170 MG/DL (ref ?–200)
CO2 SERPL-SCNC: 25 MMOL/L (ref 21–32)
COMPLEXED PSA SERPL-MCNC: 0.65 NG/ML (ref ?–4)
CREAT BLD-MCNC: 0.92 MG/DL
EGFRCR SERPLBLD CKD-EPI 2021: 98 ML/MIN/1.73M2 (ref 60–?)
EST. AVERAGE GLUCOSE BLD GHB EST-MCNC: 151 MG/DL (ref 68–126)
FASTING PATIENT LIPID ANSWER: YES
FASTING STATUS PATIENT QL REPORTED: YES
GLOBULIN PLAS-MCNC: 2.7 G/DL (ref 2–3.5)
GLUCOSE BLD-MCNC: 123 MG/DL (ref 70–99)
HBA1C MFR BLD: 6.9 % (ref ?–5.7)
HDLC SERPL-MCNC: 32 MG/DL (ref 40–59)
LDLC SERPL CALC-MCNC: 121 MG/DL (ref ?–100)
NONHDLC SERPL-MCNC: 138 MG/DL (ref ?–130)
OSMOLALITY SERPL CALC.SUM OF ELEC: 291 MOSM/KG (ref 275–295)
POTASSIUM SERPL-SCNC: 3.8 MMOL/L (ref 3.5–5.1)
PROT SERPL-MCNC: 7.5 G/DL (ref 5.7–8.2)
SODIUM SERPL-SCNC: 139 MMOL/L (ref 136–145)
TRIGL SERPL-MCNC: 89 MG/DL (ref 30–149)
VLDLC SERPL CALC-MCNC: 16 MG/DL (ref 0–30)

## 2025-03-18 PROCEDURE — 3079F DIAST BP 80-89 MM HG: CPT | Performed by: FAMILY MEDICINE

## 2025-03-18 PROCEDURE — 80053 COMPREHEN METABOLIC PANEL: CPT | Performed by: FAMILY MEDICINE

## 2025-03-18 PROCEDURE — 83036 HEMOGLOBIN GLYCOSYLATED A1C: CPT | Performed by: FAMILY MEDICINE

## 2025-03-18 PROCEDURE — 3008F BODY MASS INDEX DOCD: CPT | Performed by: FAMILY MEDICINE

## 2025-03-18 PROCEDURE — 90471 IMMUNIZATION ADMIN: CPT | Performed by: FAMILY MEDICINE

## 2025-03-18 PROCEDURE — 80061 LIPID PANEL: CPT | Performed by: FAMILY MEDICINE

## 2025-03-18 PROCEDURE — 3044F HG A1C LEVEL LT 7.0%: CPT | Performed by: FAMILY MEDICINE

## 2025-03-18 PROCEDURE — 90715 TDAP VACCINE 7 YRS/> IM: CPT | Performed by: FAMILY MEDICINE

## 2025-03-18 PROCEDURE — 3074F SYST BP LT 130 MM HG: CPT | Performed by: FAMILY MEDICINE

## 2025-03-18 PROCEDURE — 99396 PREV VISIT EST AGE 40-64: CPT | Performed by: FAMILY MEDICINE

## 2025-03-18 PROCEDURE — G0103 PSA SCREENING: HCPCS | Performed by: FAMILY MEDICINE

## 2025-03-18 NOTE — H&P
Juve Carter is a 55 year old male who presents for a complete physical exam.   HPI:   Pt voices concerns of just got over gastroenteritis, decreased appetite, no recent diarrhea, vomiting, none x 3 days.  Patient has not been seen in over a year  Wt Readings from Last 6 Encounters:   03/18/25 222 lb (100.7 kg)   01/16/24 230 lb 9.6 oz (104.6 kg)   01/03/24 225 lb (102.1 kg)   10/10/23 230 lb (104.3 kg)   10/25/22 228 lb (103.4 kg)   04/05/22 222 lb (100.7 kg)     Body mass index is 32.78 kg/m².     Cholesterol, Total (mg/dL)   Date Value   03/18/2025 170   01/16/2024 179   10/10/2023 179     HDL Cholesterol (mg/dL)   Date Value   03/18/2025 32 (L)   01/16/2024 35 (L)   10/10/2023 32 (L)     LDL Cholesterol (mg/dL)   Date Value   03/18/2025 121 (H)   01/16/2024 114 (H)   10/10/2023 113 (H)     AST (U/L)   Date Value   03/18/2025 38 (H)   01/16/2024 25   10/10/2023 27     ALT (U/L)   Date Value   03/18/2025 64 (H)   01/16/2024 75 (H)   10/10/2023 74 (H)      Current Outpatient Medications   Medication Sig Dispense Refill    SITagliptin Phosphate 100 MG Oral Tab Take 1 tablet (100 mg total) by mouth daily. NEEDS LABS AND PHYSICAL 30 tablet 0    lisinopril 20 MG Oral Tab Take 1 tablet (20 mg total) by mouth daily. NEEDS LABS AND PHYSICAL 30 tablet 0    Blood Glucose Monitoring Suppl Does not apply Kit As directed----use to check blood glucose once daily 1 kit 0    Glucose Blood (BLOOD GLUCOSE TEST) In Vitro Strip As directed----use to check blood glucose once daily 100 strip 3    Lancets Does not apply Misc As directed----use to check blood glucose 100 each 3     Allergies[1]     Past Medical History:    Diabetes (HCC)    Fatty liver    H/O herpes genitalis    HTN (hypertension)    Lactose intolerance    Pre-diabetes      Past Surgical History:   Procedure Laterality Date    Colonoscopy  03/20/2024    hyperplastic polyp    Hernia surgery      right inguinal herniorrhaphy-laprascopic    Myringotomy,  laser-assisted      Nuc stress test, cardio (dmg)  06/01/2011    NEGATIVE    Other surgical history      PE TUBES    Skin surgery      REMOVAL BENIGN MASS RIGHT FOREARM      Family History   Problem Relation Age of Onset    Cancer Father         METASTATIC MELANOMA TO BRAIN    Hypertension Father     Cancer Mother         SKIN    Cancer Sister 43        BREAST      Social History:  Social History     Socioeconomic History    Marital status:    Tobacco Use    Smoking status: Never     Passive exposure: Never    Smokeless tobacco: Never   Vaping Use    Vaping status: Never Used   Substance and Sexual Activity    Alcohol use: Not Currently     Comment: RARE    Drug use: No   Other Topics Concern    Caffeine Concern No    Exercise No    Seat Belt Yes    Special Diet No    Stress Concern No    Weight Concern No     Occ:  for the [employer] district. : +. Children: one son 17.   Exercise: walks a lot at work 18K steps per day.  Diet: low carb diet, limiting fast food, 24 oz caffeine/day, no alcohol     REVIEW OF SYSTEMS:   GENERAL: generally feels well, no fatigue, denies excessive daytime drowsiness, good appetite, wt down 8# over past yr  SKIN: denies any unusual skin lesions or rashes  EYES:denies blurred vision or double vision, glasses/ contacts: no, last eye exam:\"awhile\"  ENT: denies nasal congestion, PND or ST, + snoring , denies reported periods of apnea, denies hearing deficits  LUNGS: denies shortness of breath with exertion, no coughing or wheezing  CARDIOVASCULAR: denies chest pain on exertion, palpations, anginal equivalent symptoms, no claudication , no peripheral edema, pt has been checking bp weekly, 120/79?  GI: denies abdominal pain,denies heartburn, constipation, diarrhea, or change in bowel habits  : denies dysuria, hesitancy,nocturia, decreased urine stream, incontinence, erectile dysfunction, decreased libido   MUSCULOSKELETAL: denies back or joint pain  NEURO: denies  headaches, tremors, dizziness, numbness and weakness  PSYCHE: denies depression or anxiety, denies any sleep difficulty,   HEMATOLOGIC: denies unexplained bruising or bleeding  ENDOCRINE: denies heat or cold intolerance , no unexplained wt loss or gain, pt has been checking bs, 120-112  EXAM:   /84   Pulse 98   Temp 97.8 °F (36.6 °C) (Temporal)   Resp 16   Ht 5' 9\" (1.753 m)   Wt 222 lb (100.7 kg)   SpO2 98%   BMI 32.78 kg/m²   Body mass index is 32.78 kg/m².   GENERAL: well developed, well nourished,in no apparent distress  SKIN: no rashes,no suspicious lesions  ENT: EAC:  Clear, TMs: intact, Nose: turbinates normal, septum: midline, discharge:  none, Pharynx: class 3 airway, no oral lesions  EYES:PERRLA, EOMI, normal optic disk,conjunctiva are clear  NECK: supple,no adenopathy,no bruits, no thyromegaly  LUNGS: clear to auscultation, no w/r/r  CARDIO: RRR without murmur, no S3, S4, strong peripheral pulses, no peripheral edema, varicose veins right lower leg  GI: +NBS's,no masses, HSM or tenderness, small reducible umbilical hernia  : two descended testes,no masses, small reducible right inguinal hernia,no penile lesions  BACK:  : FROM, No lateral curves, Flexion:  Fingers to floor   EXTREMITIES: no cyanosis, clubbing or edema, FROM of all joints tested  BILATERAL FOOT EXAM OF PLANTAR ASPECT: WITHOUT CALLUS, ULCERS OR GROSS DEFORMITIES, SENSATION INTACT TO MONOFILAMENT TESTING, GOOD DP / PT PULSES, NAILS W/O DYSTROPHIC CHANGES  NEURO: A &O X 3,cranial nerves are intact,motor and sensory are grossly intact, DTRs +2/4 UE/LE bilaterally  PSYCH: affect: bright, speech: clear and coherent, Insight: appropriate  ASSESSMENT AND PLAN:   Juve Carter is a 55 year old male   Encounter Diagnoses   Name Primary?    Preventative health care Yes    Essential hypertension     Type 2 diabetes mellitus with other specified complication, without long-term current use of insulin (HCC)     Dyslipidemia with low  high density lipoprotein (HDL) cholesterol with hypertriglyceridemia due to type 2 diabetes mellitus (HCC)     Snoring     Screening for prostate cancer     Need for vaccination     FH: melanoma     Fatty liver     Obesity (BMI 30.0-34.9)      Orders Placed This Encounter   Procedures    Lipid Panel [E]    PSA, Total (Screening) [E]    Comp Metabolic Panel (14) [E]    Hemoglobin A1C [E]    Microalb/Creat Ratio, Random Urine [E]    TdaP (Boostrix/Adacel) Vaccine (> 7 Y)     Meds & Refills for this Visit:  Requested Prescriptions      No prescriptions requested or ordered in this encounter     Imaging & Consults:  TETANUS, DIPHTHERIA TOXOIDS AND ACELLULAR PERTUSIS VACCINE (TDAP), >7 YEARS, IM USE  No follow-ups on file.  Patient Instructions   1. Preventative health care  I reviewed age-appropriate preventive health screening exams as well as immunizations.  Patient declines pneumococcal and Shingrix vaccine    2. Essential hypertension  I reviewed goals for blood pressure as well as conservative management of hypertension including sodium restriction, daily aerobic activity, alcohol moderation, smoking cessation, and maintaining ideal body weight.  Continue current meds and home blood pressure monitoring  - Comp Metabolic Panel (14) [E]; Future    3. Type 2 diabetes mellitus with other specified complication, without long-term current use of insulin (HCC)  Discussed importance of continued home glucose monitoring.  I reviewed goals for fasting and postmeal blood sugar as well as hemoglobin A1c.  Discussed importance of annual dilated retinal exams and glaucoma screening as well as routine foot exams and good fitting footwear.  I discussed discussed the importance of regularly scheduled follow-up appointments  - Comp Metabolic Panel (14) [E]; Future  - Hemoglobin A1C [E]; Future  - Microalb/Creat Ratio, Random Urine [E]; Future    4. Dyslipidemia with low high density lipoprotein (HDL) cholesterol with  hypertriglyceridemia due to type 2 diabetes mellitus (HCC)  Reviewed goals for lipids.  Check labs  - Lipid Panel [E]; Future    5. Snoring  Reviewed signs symptoms of obstructive sleep apnea as well as cardiovascular complication of untreated disease.  Patient declines home sleep study due to avoidance of CPAP    6. Screening for prostate cancer  Continue annual surveillance  - PSA, Total (Screening) [E]; Future    7. Need for vaccination  Tdap booster given  - TdaP (Boostrix/Adacel) Vaccine (> 7 Y)    8. FH: melanoma  Recommend routine annual whole body exams    9. Fatty liver  Discussed importance of weight loss    10. Obesity (BMI 30.0-34.9)  Reviewed weight loss management strategies including lower carbohydrate food choices, avoidance of starches, eating behavior modification and daily aerobic activity    Lio Szymanski DO, FAAFP         [1]   Allergies  Allergen Reactions    Metformin DIARRHEA

## 2025-03-18 NOTE — PATIENT INSTRUCTIONS
1. Preventative health care  I reviewed age-appropriate preventive health screening exams as well as immunizations.  Patient declines pneumococcal and Shingrix vaccine    2. Essential hypertension  I reviewed goals for blood pressure as well as conservative management of hypertension including sodium restriction, daily aerobic activity, alcohol moderation, smoking cessation, and maintaining ideal body weight.  Continue current meds and home blood pressure monitoring  - Comp Metabolic Panel (14) [E]; Future    3. Type 2 diabetes mellitus with other specified complication, without long-term current use of insulin (HCC)  Discussed importance of continued home glucose monitoring.  I reviewed goals for fasting and postmeal blood sugar as well as hemoglobin A1c.  Discussed importance of annual dilated retinal exams and glaucoma screening as well as routine foot exams and good fitting footwear.  I discussed discussed the importance of regularly scheduled follow-up appointments  - Comp Metabolic Panel (14) [E]; Future  - Hemoglobin A1C [E]; Future  - Microalb/Creat Ratio, Random Urine [E]; Future    4. Dyslipidemia with low high density lipoprotein (HDL) cholesterol with hypertriglyceridemia due to type 2 diabetes mellitus (HCC)  Reviewed goals for lipids.  Check labs  - Lipid Panel [E]; Future    5. Snoring  Reviewed signs symptoms of obstructive sleep apnea as well as cardiovascular complication of untreated disease.  Patient declines home sleep study due to avoidance of CPAP    6. Screening for prostate cancer  Continue annual surveillance  - PSA, Total (Screening) [E]; Future    7. Need for vaccination  Tdap booster given  - TdaP (Boostrix/Adacel) Vaccine (> 7 Y)    8. FH: melanoma  Recommend routine annual whole body exams    9. Fatty liver  Discussed importance of weight loss    10. Obesity (BMI 30.0-34.9)  Reviewed weight loss management strategies including lower carbohydrate food choices, avoidance of starches,  eating behavior modification and daily aerobic activity

## 2025-03-19 DIAGNOSIS — Z12.5 SCREENING FOR PROSTATE CANCER: ICD-10-CM

## 2025-03-19 DIAGNOSIS — E11.9 TYPE 2 DIABETES MELLITUS WITHOUT COMPLICATION, WITHOUT LONG-TERM CURRENT USE OF INSULIN (HCC): ICD-10-CM

## 2025-03-19 DIAGNOSIS — I10 ESSENTIAL HYPERTENSION: Primary | ICD-10-CM

## 2025-04-09 DIAGNOSIS — I10 ESSENTIAL HYPERTENSION: ICD-10-CM

## 2025-04-09 RX ORDER — LISINOPRIL 20 MG/1
20 TABLET ORAL DAILY
Qty: 90 TABLET | Refills: 1 | Status: SHIPPED | OUTPATIENT
Start: 2025-04-09

## 2025-04-09 NOTE — TELEPHONE ENCOUNTER
Last office visit: 3/18/25  Last refill: 3/1/25  Labs Due: 3/19/26  No future appointments.   SITagliptin Phosphate 100 MG Oral Tab         Sig: Take 1 tablet (100 mg total) by mouth daily.    Disp: 90 tablet    Refills: 1    Start: 4/9/2025    Class: Normal    Last ordered: 1 month ago (3/1/2025) by Lio Szymanski DO    Diabetes Medication Protocol [id number]/09/2025 02:56 PM   Protocol Details Last A1C < 7.5 and within past 6 months    In person appointment or virtual visit in the past 6 mos or appointment in next 3 mos    Microalbumin procedure in past 12 months or taking ACE/ARB    EGFRCR or GFRNAA > 50    GFR in the past 12 months    Medication is active on med list       lisinopril 20 MG Oral Tab         Sig: Take 1 tablet (20 mg total) by mouth daily.    Disp: 90 tablet    Refills: 0    Start: 4/9/2025    Class: Normal    For: Essential hypertension    Last ordered: 1 month ago (3/1/2025) by Lio Szymanski DO    Hypertension Medications Protocol Imjoze0204/09/2025 02:56 PM   Protocol Details CMP or BMP in past 12 months    Last BP reading less than 140/90    In person appointment or virtual visit in the past 12 mos or appointment in next 3 mos    EGFRCR or GFRNAA > 50    Medication is active on med list      To be filled at: [id number] - [place], [place] - [address] AT [address] & [address] ([address]), [phone], [phone]

## (undated) DIAGNOSIS — Z12.5 SCREENING FOR PROSTATE CANCER: ICD-10-CM

## (undated) DIAGNOSIS — I10 ESSENTIAL HYPERTENSION: Primary | ICD-10-CM

## (undated) DIAGNOSIS — E78.00 ELEVATED CHOLESTEROL: ICD-10-CM

## (undated) DIAGNOSIS — Z79.899 ENCOUNTER FOR LONG-TERM (CURRENT) DRUG USE: ICD-10-CM

## (undated) DIAGNOSIS — E11.9 TYPE 2 DIABETES MELLITUS WITHOUT COMPLICATION, WITHOUT LONG-TERM CURRENT USE OF INSULIN (HCC): ICD-10-CM

## (undated) NOTE — LETTER
01/06/22        Juve Lloyd [id number]      Dear Venkata Miarnda records indicate that you have outstanding lab work and or testing that was ordered for you and has not yet been completed:  Orders Placed This Encounter      Comp

## (undated) NOTE — MR AVS SNAPSHOT
[facility]  [address] [address]  [phone]               Thank you for choosing us for your health care visit with Alena Pillai. Elinor Campos DO.   We are glad to serve you and happy to provide you with this sum Referral Information     Referral Order Referred to Address [facility] Phone Visits Status Diagnosis           Screening for hypercholesterolemia [717973]  Laboratory exam ordered as part of routine general medical examination [161611]           Laboratory exam priority on exercise in your life                    Visit [facility] online at  [email or web address]

## (undated) NOTE — LETTER
[facility] [place]  [address] [address]  Dept: [phone]  Dept Fax: [phone]         November 28, 2017    Patient: Jaiden Seals   YOB: 1970   Date of Visit: 11/28/2017       To Whom It May Concern:

## (undated) NOTE — LETTER
Date: 12/1/2017    Patient Name: Itz Huitron          To Whom it may concern: This letter has been written at the patient's request. The above patient was seen at the [facility] for treatment of a medical condition.     This patient radhau

## (undated) NOTE — LETTER
[address] [address]  [phone]          10/25/22      Charlene Lara  3/19/1970    To whom it may concern,    Mr Jessica Maria was seen in my office today for a work related problem. He may return to work     Sincerely,    Gisel Mancini.  Alexandre Carbone, FAAFP

## (undated) NOTE — Clinical Note
Ezequiel Patel saw Karlene Reneedericks in the office, he presents with multiple complaints. He has a complaint of new left-sided lower quadrant abdominal pain. It is possible this is diverticulitis. I am going to schedule him for CT scan of the abdomen. Additionally he presents for colonoscopy and has an umbilical hernia which also needs to be repaired. Will take care of all these issues.   Thank you Darin Leon